# Patient Record
Sex: FEMALE | Race: WHITE | NOT HISPANIC OR LATINO | Employment: OTHER | RURAL
[De-identification: names, ages, dates, MRNs, and addresses within clinical notes are randomized per-mention and may not be internally consistent; named-entity substitution may affect disease eponyms.]

---

## 2022-09-28 ENCOUNTER — OFFICE VISIT (OUTPATIENT)
Dept: FAMILY MEDICINE | Facility: CLINIC | Age: 77
End: 2022-09-28
Payer: MEDICARE

## 2022-09-28 VITALS
DIASTOLIC BLOOD PRESSURE: 80 MMHG | OXYGEN SATURATION: 98 % | HEIGHT: 66 IN | WEIGHT: 230 LBS | BODY MASS INDEX: 36.96 KG/M2 | TEMPERATURE: 98 F | HEART RATE: 78 BPM | RESPIRATION RATE: 16 BRPM | SYSTOLIC BLOOD PRESSURE: 128 MMHG

## 2022-09-28 DIAGNOSIS — Z12.31 SCREENING MAMMOGRAM FOR BREAST CANCER: ICD-10-CM

## 2022-09-28 DIAGNOSIS — Z23 NEED FOR PROPHYLACTIC VACCINATION AND INOCULATION AGAINST INFLUENZA: ICD-10-CM

## 2022-09-28 DIAGNOSIS — Z12.11 COLON CANCER SCREENING: ICD-10-CM

## 2022-09-28 DIAGNOSIS — Z79.899 ENCOUNTER FOR LONG-TERM (CURRENT) USE OF OTHER MEDICATIONS: ICD-10-CM

## 2022-09-28 DIAGNOSIS — Z78.0 MENOPAUSE: ICD-10-CM

## 2022-09-28 DIAGNOSIS — Z13.1 SCREENING FOR DIABETES MELLITUS: ICD-10-CM

## 2022-09-28 DIAGNOSIS — E03.9 ACQUIRED HYPOTHYROIDISM: Primary | ICD-10-CM

## 2022-09-28 DIAGNOSIS — E55.9 VITAMIN D DEFICIENCY: ICD-10-CM

## 2022-09-28 DIAGNOSIS — Z13.220 SCREENING FOR LIPOID DISORDERS: ICD-10-CM

## 2022-09-28 DIAGNOSIS — L40.9 PSORIASIS: ICD-10-CM

## 2022-09-28 LAB
25(OH)D3 SERPL-MCNC: 23.6 NG/ML
ALBUMIN SERPL BCP-MCNC: 3.9 G/DL (ref 3.5–5)
ALBUMIN/GLOB SERPL: 1.1 {RATIO}
ALP SERPL-CCNC: 85 U/L (ref 55–142)
ALT SERPL W P-5'-P-CCNC: 44 U/L (ref 13–56)
ANION GAP SERPL CALCULATED.3IONS-SCNC: 12 MMOL/L (ref 7–16)
AST SERPL W P-5'-P-CCNC: 19 U/L (ref 15–37)
BASOPHILS # BLD AUTO: 0.06 K/UL (ref 0–0.2)
BASOPHILS NFR BLD AUTO: 0.9 % (ref 0–1)
BILIRUB SERPL-MCNC: 0.3 MG/DL (ref ?–1.2)
BUN SERPL-MCNC: 12 MG/DL (ref 7–18)
BUN/CREAT SERPL: 16 (ref 6–20)
CALCIUM SERPL-MCNC: 9.1 MG/DL (ref 8.5–10.1)
CHLORIDE SERPL-SCNC: 102 MMOL/L (ref 98–107)
CHOLEST SERPL-MCNC: 232 MG/DL (ref 0–200)
CHOLEST/HDLC SERPL: 6.6 {RATIO}
CO2 SERPL-SCNC: 30 MMOL/L (ref 21–32)
CREAT SERPL-MCNC: 0.74 MG/DL (ref 0.55–1.02)
DIFFERENTIAL METHOD BLD: ABNORMAL
EGFR (NO RACE VARIABLE) (RUSH/TITUS): 84 ML/MIN/1.73M²
EOSINOPHIL # BLD AUTO: 0.19 K/UL (ref 0–0.5)
EOSINOPHIL NFR BLD AUTO: 2.9 % (ref 1–4)
ERYTHROCYTE [DISTWIDTH] IN BLOOD BY AUTOMATED COUNT: 13.4 % (ref 11.5–14.5)
EST. AVERAGE GLUCOSE BLD GHB EST-MCNC: 107 MG/DL
GLOBULIN SER-MCNC: 3.4 G/DL (ref 2–4)
GLUCOSE SERPL-MCNC: 88 MG/DL (ref 74–106)
HBA1C MFR BLD HPLC: 5.8 % (ref 4.5–6.6)
HCT VFR BLD AUTO: 43.7 % (ref 38–47)
HDLC SERPL-MCNC: 35 MG/DL (ref 40–60)
HGB BLD-MCNC: 14.9 G/DL (ref 12–16)
IMM GRANULOCYTES # BLD AUTO: 0.01 K/UL (ref 0–0.04)
IMM GRANULOCYTES NFR BLD: 0.2 % (ref 0–0.4)
LDLC SERPL CALC-MCNC: 161 MG/DL
LDLC/HDLC SERPL: 4.6 {RATIO}
LYMPHOCYTES # BLD AUTO: 1.95 K/UL (ref 1–4.8)
LYMPHOCYTES NFR BLD AUTO: 29.8 % (ref 27–41)
MCH RBC QN AUTO: 29.9 PG (ref 27–31)
MCHC RBC AUTO-ENTMCNC: 34.1 G/DL (ref 32–36)
MCV RBC AUTO: 87.6 FL (ref 80–96)
MONOCYTES # BLD AUTO: 0.59 K/UL (ref 0–0.8)
MONOCYTES NFR BLD AUTO: 9 % (ref 2–6)
MPC BLD CALC-MCNC: 10.2 FL (ref 9.4–12.4)
NEUTROPHILS # BLD AUTO: 3.74 K/UL (ref 1.8–7.7)
NEUTROPHILS NFR BLD AUTO: 57.2 % (ref 53–65)
NONHDLC SERPL-MCNC: 197 MG/DL
NRBC # BLD AUTO: 0 X10E3/UL
NRBC, AUTO (.00): 0 %
PLATELET # BLD AUTO: 307 K/UL (ref 150–400)
POTASSIUM SERPL-SCNC: 4.6 MMOL/L (ref 3.5–5.1)
PROT SERPL-MCNC: 7.3 G/DL (ref 6.4–8.2)
RBC # BLD AUTO: 4.99 M/UL (ref 4.2–5.4)
SODIUM SERPL-SCNC: 139 MMOL/L (ref 136–145)
T4 FREE SERPL-MCNC: 1.22 NG/DL (ref 0.76–1.46)
TRIGL SERPL-MCNC: 182 MG/DL (ref 35–150)
TSH SERPL DL<=0.005 MIU/L-ACNC: 2.84 UIU/ML (ref 0.36–3.74)
VLDLC SERPL-MCNC: 36 MG/DL
WBC # BLD AUTO: 6.54 K/UL (ref 4.5–11)

## 2022-09-28 PROCEDURE — 99204 PR OFFICE/OUTPT VISIT, NEW, LEVL IV, 45-59 MIN: ICD-10-PCS | Mod: ,,, | Performed by: FAMILY MEDICINE

## 2022-09-28 PROCEDURE — G0008 FLU VACCINE - QUADRIVALENT - ADJUVANTED: ICD-10-PCS | Mod: ,,, | Performed by: FAMILY MEDICINE

## 2022-09-28 PROCEDURE — 84439 T4, FREE: ICD-10-PCS | Mod: ,,, | Performed by: CLINICAL MEDICAL LABORATORY

## 2022-09-28 PROCEDURE — 99204 OFFICE O/P NEW MOD 45 MIN: CPT | Mod: ,,, | Performed by: FAMILY MEDICINE

## 2022-09-28 PROCEDURE — 84443 ASSAY THYROID STIM HORMONE: CPT | Mod: ,,, | Performed by: CLINICAL MEDICAL LABORATORY

## 2022-09-28 PROCEDURE — 82306 VITAMIN D 25 HYDROXY: CPT | Mod: ,,, | Performed by: CLINICAL MEDICAL LABORATORY

## 2022-09-28 PROCEDURE — 90694 FLU VACCINE - QUADRIVALENT - ADJUVANTED: ICD-10-PCS | Mod: ,,, | Performed by: FAMILY MEDICINE

## 2022-09-28 PROCEDURE — 80053 COMPREHENSIVE METABOLIC PANEL: ICD-10-PCS | Mod: ,,, | Performed by: CLINICAL MEDICAL LABORATORY

## 2022-09-28 PROCEDURE — 85025 COMPLETE CBC W/AUTO DIFF WBC: CPT | Mod: ,,, | Performed by: CLINICAL MEDICAL LABORATORY

## 2022-09-28 PROCEDURE — G0008 ADMIN INFLUENZA VIRUS VAC: HCPCS | Mod: ,,, | Performed by: FAMILY MEDICINE

## 2022-09-28 PROCEDURE — 90694 VACC AIIV4 NO PRSRV 0.5ML IM: CPT | Mod: ,,, | Performed by: FAMILY MEDICINE

## 2022-09-28 PROCEDURE — 83036 HEMOGLOBIN A1C: ICD-10-PCS | Mod: ,,, | Performed by: CLINICAL MEDICAL LABORATORY

## 2022-09-28 PROCEDURE — 84439 ASSAY OF FREE THYROXINE: CPT | Mod: ,,, | Performed by: CLINICAL MEDICAL LABORATORY

## 2022-09-28 PROCEDURE — 85025 CBC WITH DIFFERENTIAL: ICD-10-PCS | Mod: ,,, | Performed by: CLINICAL MEDICAL LABORATORY

## 2022-09-28 PROCEDURE — 80061 LIPID PANEL: CPT | Mod: ,,, | Performed by: CLINICAL MEDICAL LABORATORY

## 2022-09-28 PROCEDURE — 80061 LIPID PANEL: ICD-10-PCS | Mod: ,,, | Performed by: CLINICAL MEDICAL LABORATORY

## 2022-09-28 PROCEDURE — 84443 TSH: ICD-10-PCS | Mod: ,,, | Performed by: CLINICAL MEDICAL LABORATORY

## 2022-09-28 PROCEDURE — 80053 COMPREHEN METABOLIC PANEL: CPT | Mod: ,,, | Performed by: CLINICAL MEDICAL LABORATORY

## 2022-09-28 PROCEDURE — 82306 VITAMIN D: ICD-10-PCS | Mod: ,,, | Performed by: CLINICAL MEDICAL LABORATORY

## 2022-09-28 PROCEDURE — 83036 HEMOGLOBIN GLYCOSYLATED A1C: CPT | Mod: ,,, | Performed by: CLINICAL MEDICAL LABORATORY

## 2022-09-28 RX ORDER — ASPIRIN 81 MG/1
81 TABLET ORAL DAILY
COMMUNITY

## 2022-09-28 RX ORDER — EPINEPHRINE 0.22MG
200 AEROSOL WITH ADAPTER (ML) INHALATION DAILY
COMMUNITY

## 2022-09-28 RX ORDER — LEVOTHYROXINE SODIUM 150 UG/1
150 TABLET ORAL DAILY
COMMUNITY
Start: 2022-06-22 | End: 2022-09-28 | Stop reason: SDUPTHER

## 2022-09-28 RX ORDER — LEVOTHYROXINE SODIUM 150 UG/1
150 TABLET ORAL DAILY
Qty: 30 TABLET | Refills: 0 | Status: SHIPPED | OUTPATIENT
Start: 2022-09-28 | End: 2022-10-05 | Stop reason: SDUPTHER

## 2022-09-28 RX ORDER — ACETAMINOPHEN 500 MG
5000 TABLET ORAL DAILY
COMMUNITY

## 2022-09-28 RX ORDER — LANOLIN ALCOHOL/MO/W.PET/CERES
1000 CREAM (GRAM) TOPICAL DAILY
COMMUNITY

## 2022-09-28 NOTE — PROGRESS NOTES
Subjective:       Patient ID: Stephanie Radford is a 77 y.o. female.    Chief Complaint: Establish Care    New pt, getting est.  Reviewed PMH:  she brought records, will be scanned.    Review of Systems   Constitutional:  Negative for appetite change, chills, fatigue, fever and unexpected weight change.   Respiratory:  Negative for cough and shortness of breath.    Cardiovascular:  Negative for chest pain and leg swelling.   Gastrointestinal:  Negative for abdominal pain.   Musculoskeletal:  Negative for arthralgias.   Integumentary:  Negative for rash.   Neurological:  Negative for weakness.   Psychiatric/Behavioral:  The patient is not nervous/anxious.        Objective:      Physical Exam  Constitutional:       General: She is not in acute distress.     Appearance: Normal appearance.   Cardiovascular:      Rate and Rhythm: Normal rate and regular rhythm.      Heart sounds: Normal heart sounds.   Pulmonary:      Breath sounds: Normal breath sounds.   Abdominal:      General: Abdomen is flat.      Palpations: Abdomen is soft.   Skin:     General: Skin is warm and dry.   Neurological:      Mental Status: She is alert. Mental status is at baseline.   Psychiatric:         Mood and Affect: Mood normal.         Behavior: Behavior normal.         Thought Content: Thought content normal.         Judgment: Judgment normal.       Assessment:       1. Acquired hypothyroidism  CBC Auto Differential    Lipid Panel    T4, Free    TSH    CBC Auto Differential    Lipid Panel    T4, Free    TSH      2. Need for prophylactic vaccination and inoculation against influenza  Influenza (FLUAD) - Quadrivalent (Adjuvanted) *Preferred* (65+) (PF)      3. Encounter for long-term (current) use of other medications  CBC Auto Differential    Comprehensive Metabolic Panel    Hemoglobin A1C    Lipid Panel    Vitamin D    CBC Auto Differential    Comprehensive Metabolic Panel    Hemoglobin A1C    Lipid Panel    Vitamin D      4. BMI 37.0-37.9,  adult  Comprehensive Metabolic Panel    Hemoglobin A1C    Comprehensive Metabolic Panel    Hemoglobin A1C      5. Screening for lipoid disorders  Lipid Panel    Lipid Panel      6. Screening for diabetes mellitus  Comprehensive Metabolic Panel    Hemoglobin A1C    Comprehensive Metabolic Panel    Hemoglobin A1C      7. Vitamin D deficiency  Vitamin D    Vitamin D      8. Psoriasis  CBC Auto Differential    Comprehensive Metabolic Panel    Hemoglobin A1C    Lipid Panel    CBC Auto Differential    Comprehensive Metabolic Panel    Hemoglobin A1C    Lipid Panel    Ambulatory referral/consult to Dermatology      9. Screening mammogram for breast cancer  Mammo Digital Screening Bilat      10. Menopause  DXA Bone Density Spine And Hip      11. Colon cancer screening  Cologuard Screening (Multitarget Stool DNA)    Cologuard Screening (Multitarget Stool DNA)          Plan:       Labs  Maria Alejandra  Pt will make her own eye appt  Cologuard  BMD same day as maria alejandra  Flu vacc today  Synthroid 150 30 day supply--[facility]  Derm referral--psoriasis, arthritis

## 2022-10-03 PROBLEM — E55.9 VITAMIN D DEFICIENCY: Status: ACTIVE | Noted: 2022-10-03

## 2022-10-03 PROBLEM — Z12.31 SCREENING MAMMOGRAM FOR BREAST CANCER: Status: ACTIVE | Noted: 2022-10-03

## 2022-10-03 PROBLEM — E03.9 ACQUIRED HYPOTHYROIDISM: Status: ACTIVE | Noted: 2022-10-03

## 2022-10-03 PROBLEM — Z79.899 ENCOUNTER FOR LONG-TERM (CURRENT) USE OF OTHER MEDICATIONS: Status: ACTIVE | Noted: 2022-10-03

## 2022-10-03 PROBLEM — L40.9 PSORIASIS: Status: ACTIVE | Noted: 2022-10-03

## 2022-10-03 PROBLEM — Z78.0 MENOPAUSE: Status: ACTIVE | Noted: 2022-10-03

## 2022-10-05 RX ORDER — EZETIMIBE 10 MG/1
10 TABLET ORAL DAILY
Qty: 30 TABLET | Refills: 5 | Status: SHIPPED | OUTPATIENT
Start: 2022-10-05 | End: 2023-02-06

## 2022-10-05 RX ORDER — LEVOTHYROXINE SODIUM 150 UG/1
150 TABLET ORAL DAILY
Qty: 90 TABLET | Refills: 3 | Status: SHIPPED | OUTPATIENT
Start: 2022-10-05 | End: 2023-09-27 | Stop reason: DRUGHIGH

## 2022-10-09 DIAGNOSIS — Z71.89 COMPLEX CARE COORDINATION: ICD-10-CM

## 2022-10-27 ENCOUNTER — HOSPITAL ENCOUNTER (OUTPATIENT)
Dept: RADIOLOGY | Facility: HOSPITAL | Age: 77
Discharge: HOME | End: 2022-10-27
Attending: FAMILY MEDICINE
Payer: MEDICARE

## 2022-10-27 VITALS — HEIGHT: 66 IN | BODY MASS INDEX: 36.96 KG/M2 | WEIGHT: 230 LBS

## 2022-10-27 DIAGNOSIS — Z12.31 SCREENING MAMMOGRAM FOR BREAST CANCER: ICD-10-CM

## 2022-10-27 DIAGNOSIS — Z78.0 MENOPAUSE: ICD-10-CM

## 2022-10-27 PROCEDURE — 77080 DXA BONE DENSITY AXIAL: CPT | Mod: TC

## 2022-10-27 PROCEDURE — 77080 DEXA BONE DENSITY SPINE HIP: ICD-10-PCS | Mod: 26,,, | Performed by: STUDENT IN AN ORGANIZED HEALTH CARE EDUCATION/TRAINING PROGRAM

## 2022-10-27 PROCEDURE — 77080 DXA BONE DENSITY AXIAL: CPT | Mod: 26,,, | Performed by: STUDENT IN AN ORGANIZED HEALTH CARE EDUCATION/TRAINING PROGRAM

## 2022-10-27 PROCEDURE — 77067 SCR MAMMO BI INCL CAD: CPT | Mod: TC

## 2022-11-04 LAB — NONINV COLON CA DNA+OCC BLD SCRN STL QL: NEGATIVE

## 2022-11-17 ENCOUNTER — OFFICE VISIT (OUTPATIENT)
Dept: DERMATOLOGY | Facility: CLINIC | Age: 77
End: 2022-11-17
Payer: MEDICARE

## 2022-11-17 VITALS — BODY MASS INDEX: 36.95 KG/M2 | HEIGHT: 66 IN | WEIGHT: 229.94 LBS

## 2022-11-17 DIAGNOSIS — D18.01 CHERRY ANGIOMA: ICD-10-CM

## 2022-11-17 DIAGNOSIS — L57.8 OTHER SKIN CHANGES DUE TO CHRONIC EXPOSURE TO NONIONIZING RADIATION: Primary | ICD-10-CM

## 2022-11-17 DIAGNOSIS — L82.1 SEBORRHEIC KERATOSES: ICD-10-CM

## 2022-11-17 DIAGNOSIS — L82.0 SEBORRHEIC KERATOSES, INFLAMED: ICD-10-CM

## 2022-11-17 DIAGNOSIS — L40.9 PSORIASIS: ICD-10-CM

## 2022-11-17 PROCEDURE — 17110 DESTRUCTION B9 LES UP TO 14: CPT | Mod: ,,, | Performed by: DERMATOLOGY

## 2022-11-17 PROCEDURE — 99204 OFFICE O/P NEW MOD 45 MIN: CPT | Mod: 25,,, | Performed by: DERMATOLOGY

## 2022-11-17 PROCEDURE — 99204 PR OFFICE/OUTPT VISIT, NEW, LEVL IV, 45-59 MIN: ICD-10-PCS | Mod: 25,,, | Performed by: DERMATOLOGY

## 2022-11-17 PROCEDURE — 17110 PR DESTRUCTION BENIGN LESIONS UP TO 14: ICD-10-PCS | Mod: ,,, | Performed by: DERMATOLOGY

## 2022-11-17 RX ORDER — CLOBETASOL PROPIONATE 0.5 MG/G
CREAM TOPICAL
Qty: 60 G | Refills: 3 | Status: SHIPPED | OUTPATIENT
Start: 2022-11-17

## 2022-11-17 NOTE — PROGRESS NOTES
[facility]   Ning Mccurdy MD    Patient Name: Stephanie Radford  Patient YOB: 1945   Date of Service: 11/17/22    CC: Psoriasis    HPI: Stephanie Radford is a 77 y.o. female here today for psoriasis, located on the right forearm and right ankle.  Psoriasis has been present for 20 years.  Previous treatments include none.  Patient is also concerned today about lesions on neck, chest, face, and back. Patient has history of AK's and excisions but does not remember the pathology.    Past Medical History:   Diagnosis Date    Hypothyroidism      Past Surgical History:   Procedure Laterality Date    TUBAL LIGATION       Review of patient's allergies indicates:   Allergen Reactions    Penicillins Hives    Tylenol [acetaminophen]     Clindamycin Rash    Rocephin [ceftriaxone] Rash       Current Outpatient Medications:     aspirin (ECOTRIN) 81 MG EC tablet, Take 81 mg by mouth once daily., Disp: , Rfl:     cholecalciferol, vitamin D3, (VITAMIN D3) 125 mcg (5,000 unit) Tab, Take 5,000 Units by mouth once daily., Disp: , Rfl:     clobetasoL (TEMOVATE) 0.05 % cream, Apply to AA on body BID PRN flares tapering with improvement, Disp: 60 g, Rfl: 3    coenzyme Q10 (CO Q-10) 100 mg capsule, Take 200 mg by mouth once daily., Disp: , Rfl:     cyanocobalamin (VITAMIN B-12) 1000 MCG tablet, Take 1,000 mcg by mouth once daily., Disp: , Rfl:     ezetimibe (ZETIA) 10 mg tablet, Take 1 tablet (10 mg total) by mouth once daily., Disp: 30 tablet, Rfl: 5    levothyroxine (SYNTHROID) 150 MCG tablet, Take 1 tablet (150 mcg total) by mouth once daily., Disp: 90 tablet, Rfl: 3    multivit-min/iron/folic/lutein (CENTRUM SILVER WOMEN ORAL), Take 1 tablet by mouth Daily., Disp: , Rfl:     ROS: A focused review of systems was obtained and negative.     Exam: A focused skin exam was performed. All areas examined were normal except as mentioned in the assessment and plan below.  General Appearance of the patient is well developed  and well nourished.  Orientation: alert and oriented x 3.  Mood and affect: pleasant.    Assessment:   The primary encounter diagnosis was Other skin changes due to chronic exposure to nonionizing radiation. Diagnoses of Psoriasis, Seborrheic keratoses, Cherry angioma, and Seborrheic keratoses, inflamed were also pertinent to this visit.    Plan:   Medications Ordered This Encounter   Medications    clobetasoL (TEMOVATE) 0.05 % cream     Sig: Apply to AA on body BID PRN flares tapering with improvement     Dispense:  60 g     Refill:  3       Plaque Psoriasis  - psoriasiform plaques with micaceous scale  Status: Inadequately controlled    Plan: Counseling  I counseled the patient regarding the following:  Skin care: Emollients, ambient sun exposure, shampoos with tar, selenium or zinc pyrithione can improve psoriasis.  Expectations: Psoriasis is chronic in nature with periods of remissions and flares. Flares can be triggered by stress, infections (group A strep), certain medications and alcohol.  Contact office if: Psoriasis worsens, or fails to improve despite several months of treatment.      Mckay Angiomas  - Scattered bright pink papules    Plan: Counseling  I counseled the patient regarding the diagnosis including that cherry angiomas are benign skin growths requiring no treatment. Patients get more as they age.    Seborrheic Keratosis (L82.1)  - Stuck-on, warty, greasy brown papule with pseudo-horn cysts scattered on the trunk and extremities    Plan: Counseling.  I counseled the patient regarding the following:  Skin Care: Seborrheic Keratoses are benign. No treatment is necessary.  Expectations: Seborrheic Keratoses are benign warty growths. Patients get more of them as they age    Plan: Reassurance    Irritated Seborrheic Keratoses (L82.0)  Stuck-on inflamed papules with crust located on the right cheek.  Associated diagnoses: Pruritus and Cutaneous Inflammation    Plan: Liquid Nitrogen.  A total of 1  lesions were treated with liquid nitrogen, located on the above listed location.  This procedure was medically necessary because the lesions that were treated were: irritated and itchy. The  patient's consent was obtained including but not limited to risks of crusting, scabbing, blistering, scarring, darker  or lighter pigmentary change, recurrence, incomplete removal and infection.      Other Skin Changes Due to Chronic Exposure of Nonionizing Radiation (L57.8)    Plan: Monitoring.     Plan: Sunscreen Recommendations.  I recommended a broad spectrum sunscreen with a SPF of 30 or higher. I explained that SPF 30 sunscreens block approximately 97 percent of the  sun's harmful rays. Sunscreens should be applied at least 15 minutes prior to expected sun exposure and then every 2 hours after that as long as  sun exposure continues. If swimming or exercising sunscreen should be reapplied every 45 minutes to an hour after getting wet or sweating. One  ounce, or the equivalent of a shot glass full of sunscreen, is adequate to protect the skin not covered by a bathing suit. I also recommended a lip  balm with a sunscreen as well. Sun protective clothing can be used in lieu of sunscreen but must be worn the entire time you are exposed to the  sun's rays.    Follow up in 3 months (on 2/17/2023) for Psoriasis.    Ning Mccurdy MD

## 2023-02-20 ENCOUNTER — OFFICE VISIT (OUTPATIENT)
Dept: DERMATOLOGY | Facility: CLINIC | Age: 78
End: 2023-02-20
Payer: MEDICARE

## 2023-02-20 VITALS — HEIGHT: 66 IN | BODY MASS INDEX: 36.95 KG/M2 | WEIGHT: 229.94 LBS

## 2023-02-20 DIAGNOSIS — L40.9 PSORIASIS: Primary | ICD-10-CM

## 2023-02-20 PROCEDURE — 99214 PR OFFICE/OUTPT VISIT, EST, LEVL IV, 30-39 MIN: ICD-10-PCS | Mod: ,,, | Performed by: DERMATOLOGY

## 2023-02-20 PROCEDURE — 99214 OFFICE O/P EST MOD 30 MIN: CPT | Mod: ,,, | Performed by: DERMATOLOGY

## 2023-02-20 RX ORDER — BETAMETHASONE DIPROPIONATE 0.5 MG/G
CREAM TOPICAL
Qty: 45 G | Refills: 3 | Status: SHIPPED | OUTPATIENT
Start: 2023-02-20 | End: 2024-02-21 | Stop reason: SDUPTHER

## 2023-02-20 RX ORDER — CLOTRIMAZOLE AND BETAMETHASONE DIPROPIONATE 10; .64 MG/G; MG/G
CREAM TOPICAL 2 TIMES DAILY
Qty: 45 G | Refills: 6 | Status: CANCELLED | OUTPATIENT
Start: 2023-02-20

## 2023-02-20 NOTE — PROGRESS NOTES
[facility]   Ning Mccurdy MD    Patient Name: Stephanie Radford  Patient YOB: 1945   Date of Service: 2/20/23    CC: Follow-up Psoriasis    HPI: Stephanie Radford is a 77 y.o. female here today for follow-up of psoriasis, last seen 11/2022.  Previous treatments include OTC psoriasis cream.  Overall, the psoriasis is improved.  Treatment plan was not followed as directed. Patient reports not using clobetasol due to price.    Past Medical History:   Diagnosis Date    Hypothyroidism      Past Surgical History:   Procedure Laterality Date    TUBAL LIGATION       Review of patient's allergies indicates:   Allergen Reactions    Penicillins Hives    Tylenol [acetaminophen]     Clindamycin Rash    Rocephin [ceftriaxone] Rash       Current Outpatient Medications:     aspirin (ECOTRIN) 81 MG EC tablet, Take 81 mg by mouth once daily., Disp: , Rfl:     cholecalciferol, vitamin D3, 125 mcg (5,000 unit) Tab, Take 5,000 Units by mouth once daily., Disp: , Rfl:     coenzyme Q10 100 mg capsule, Take 200 mg by mouth once daily., Disp: , Rfl:     cyanocobalamin (VITAMIN B-12) 1000 MCG tablet, Take 1,000 mcg by mouth once daily., Disp: , Rfl:     ezetimibe (ZETIA) 10 mg tablet, TAKE 1 TABLET BY MOUTH EVERY DAY, Disp: 90 tablet, Rfl: 0    levothyroxine (SYNTHROID) 150 MCG tablet, Take 1 tablet (150 mcg total) by mouth once daily., Disp: 90 tablet, Rfl: 3    multivit-min/iron/folic/lutein (CENTRUM SILVER WOMEN ORAL), Take 1 tablet by mouth Daily., Disp: , Rfl:     betamethasone dipropionate 0.05 % cream, Apply to AA on body BID PRN flares tapering with improvement, Disp: 45 g, Rfl: 3    clobetasoL (TEMOVATE) 0.05 % cream, Apply to AA on body BID PRN flares tapering with improvement (Patient not taking: Reported on 2/20/2023), Disp: 60 g, Rfl: 3    ROS: A focused review of systems was obtained and negative.     Exam: A focused skin exam was performed. All areas examined were normal except as mentioned in the  assessment and plan below.  General Appearance of the patient is well developed and well nourished.  Orientation: alert and oriented x 3.  Mood and affect: pleasant.    Assessment:   The encounter diagnosis was Psoriasis.    Plan:   Medications Ordered This Encounter   Medications    betamethasone dipropionate 0.05 % cream     Sig: Apply to AA on body BID PRN flares tapering with improvement     Dispense:  45 g     Refill:  3     Plaque Psoriasis  - psoriasiform plaques with micaceous scale  Status: Inadequately controlled    Plan: Counseling  I counseled the patient regarding the following:  Skin care: Emollients, ambient sun exposure, shampoos with tar, selenium or zinc pyrithione can improve psoriasis.  Expectations: Psoriasis is chronic in nature with periods of remissions and flares. Flares can be triggered by stress, infections (group A strep), certain medications and alcohol.  Contact office if: Psoriasis worsens, or fails to improve despite several months of treatment.    - Begin betamethasone    Follow up in about 1 year (around 2/20/2024) for FSE.    Ning Mccurdy MD

## 2023-03-02 ENCOUNTER — OFFICE VISIT (OUTPATIENT)
Dept: FAMILY MEDICINE | Facility: CLINIC | Age: 78
End: 2023-03-02
Payer: MEDICARE

## 2023-03-02 VITALS
HEART RATE: 83 BPM | SYSTOLIC BLOOD PRESSURE: 118 MMHG | WEIGHT: 236 LBS | HEIGHT: 66 IN | TEMPERATURE: 98 F | RESPIRATION RATE: 16 BRPM | BODY MASS INDEX: 37.93 KG/M2 | DIASTOLIC BLOOD PRESSURE: 70 MMHG | OXYGEN SATURATION: 96 %

## 2023-03-02 DIAGNOSIS — E03.9 ACQUIRED HYPOTHYROIDISM: ICD-10-CM

## 2023-03-02 DIAGNOSIS — Z79.899 ENCOUNTER FOR LONG-TERM (CURRENT) USE OF OTHER MEDICATIONS: ICD-10-CM

## 2023-03-02 DIAGNOSIS — E78.2 MIXED HYPERLIPIDEMIA: ICD-10-CM

## 2023-03-02 DIAGNOSIS — E55.9 VITAMIN D DEFICIENCY: ICD-10-CM

## 2023-03-02 DIAGNOSIS — R73.9 HYPERGLYCEMIA: Primary | ICD-10-CM

## 2023-03-02 LAB
25(OH)D3 SERPL-MCNC: 25.8 NG/ML
ALBUMIN SERPL BCP-MCNC: 3.6 G/DL (ref 3.5–5)
ALBUMIN/GLOB SERPL: 0.9 {RATIO}
ALP SERPL-CCNC: 78 U/L (ref 55–142)
ALT SERPL W P-5'-P-CCNC: 49 U/L (ref 13–56)
ANION GAP SERPL CALCULATED.3IONS-SCNC: 6 MMOL/L (ref 7–16)
AST SERPL W P-5'-P-CCNC: 31 U/L (ref 15–37)
BILIRUB SERPL-MCNC: 0.4 MG/DL (ref ?–1.2)
BUN SERPL-MCNC: 16 MG/DL (ref 7–18)
BUN/CREAT SERPL: 21 (ref 6–20)
CALCIUM SERPL-MCNC: 9.2 MG/DL (ref 8.5–10.1)
CHLORIDE SERPL-SCNC: 109 MMOL/L (ref 98–107)
CHOLEST SERPL-MCNC: 168 MG/DL (ref 0–200)
CHOLEST/HDLC SERPL: 4.4 {RATIO}
CO2 SERPL-SCNC: 27 MMOL/L (ref 21–32)
CREAT SERPL-MCNC: 0.78 MG/DL (ref 0.55–1.02)
EGFR (NO RACE VARIABLE) (RUSH/TITUS): 79 ML/MIN/1.73M²
EST. AVERAGE GLUCOSE BLD GHB EST-MCNC: 104 MG/DL
GLOBULIN SER-MCNC: 3.8 G/DL (ref 2–4)
GLUCOSE SERPL-MCNC: 84 MG/DL (ref 74–106)
HBA1C MFR BLD HPLC: 5.7 % (ref 4.5–6.6)
HDLC SERPL-MCNC: 38 MG/DL (ref 40–60)
LDLC SERPL CALC-MCNC: 100 MG/DL
LDLC/HDLC SERPL: 2.6 {RATIO}
NONHDLC SERPL-MCNC: 130 MG/DL
POTASSIUM SERPL-SCNC: 4.2 MMOL/L (ref 3.5–5.1)
PROT SERPL-MCNC: 7.4 G/DL (ref 6.4–8.2)
SODIUM SERPL-SCNC: 138 MMOL/L (ref 136–145)
TRIGL SERPL-MCNC: 151 MG/DL (ref 35–150)
VLDLC SERPL-MCNC: 30 MG/DL

## 2023-03-02 PROCEDURE — 99213 PR OFFICE/OUTPT VISIT, EST, LEVL III, 20-29 MIN: ICD-10-PCS | Mod: ,,, | Performed by: FAMILY MEDICINE

## 2023-03-02 PROCEDURE — 80053 COMPREHENSIVE METABOLIC PANEL: ICD-10-PCS | Mod: ,,, | Performed by: CLINICAL MEDICAL LABORATORY

## 2023-03-02 PROCEDURE — 82306 VITAMIN D: ICD-10-PCS | Mod: ,,, | Performed by: CLINICAL MEDICAL LABORATORY

## 2023-03-02 PROCEDURE — 82306 VITAMIN D 25 HYDROXY: CPT | Mod: ,,, | Performed by: CLINICAL MEDICAL LABORATORY

## 2023-03-02 PROCEDURE — 83036 HEMOGLOBIN A1C: ICD-10-PCS | Mod: ,,, | Performed by: CLINICAL MEDICAL LABORATORY

## 2023-03-02 PROCEDURE — 80061 LIPID PANEL: CPT | Mod: ,,, | Performed by: CLINICAL MEDICAL LABORATORY

## 2023-03-02 PROCEDURE — 80061 LIPID PANEL: ICD-10-PCS | Mod: ,,, | Performed by: CLINICAL MEDICAL LABORATORY

## 2023-03-02 PROCEDURE — 99213 OFFICE O/P EST LOW 20 MIN: CPT | Mod: ,,, | Performed by: FAMILY MEDICINE

## 2023-03-02 PROCEDURE — 83036 HEMOGLOBIN GLYCOSYLATED A1C: CPT | Mod: ,,, | Performed by: CLINICAL MEDICAL LABORATORY

## 2023-03-02 PROCEDURE — 80053 COMPREHEN METABOLIC PANEL: CPT | Mod: ,,, | Performed by: CLINICAL MEDICAL LABORATORY

## 2023-03-02 NOTE — PROGRESS NOTES
Subjective:       Patient ID: Stephanie Radford is a 77 y.o. female.    Chief Complaint: Follow-up (6 month)    Recheck on sugar/labs.  No new complaints except legs swelling.  Discussed usual causes.    Review of Systems   Constitutional:  Negative for appetite change, chills, fatigue, fever and unexpected weight change.   Respiratory:  Negative for cough and shortness of breath.    Cardiovascular:  Positive for leg swelling. Negative for chest pain.   Gastrointestinal:  Negative for abdominal pain.   Musculoskeletal:  Negative for arthralgias.   Integumentary:  Negative for rash.   Neurological:  Negative for weakness.   Psychiatric/Behavioral:  The patient is not nervous/anxious.        Objective:      Physical Exam  Constitutional:       General: She is not in acute distress.     Appearance: Normal appearance.   Cardiovascular:      Rate and Rhythm: Normal rate and regular rhythm.      Heart sounds: Normal heart sounds.   Pulmonary:      Breath sounds: Normal breath sounds.   Abdominal:      General: Abdomen is flat.      Palpations: Abdomen is soft.   Skin:     General: Skin is warm and dry.   Neurological:      Mental Status: She is alert. Mental status is at baseline.   Psychiatric:         Mood and Affect: Mood normal.         Behavior: Behavior normal.         Thought Content: Thought content normal.         Judgment: Judgment normal.       Assessment:       1. Hyperglycemia  Comprehensive Metabolic Panel    Hemoglobin A1C    Comprehensive Metabolic Panel    Hemoglobin A1C      2. Encounter for long-term (current) use of other medications  Comprehensive Metabolic Panel    Hemoglobin A1C    Lipid Panel    Vitamin D    Comprehensive Metabolic Panel    Hemoglobin A1C    Lipid Panel    Vitamin D      3. Vitamin D deficiency  Vitamin D    Vitamin D      4. Mixed hyperlipidemia  Comprehensive Metabolic Panel    Lipid Panel    Comprehensive Metabolic Panel    Lipid Panel      5. Acquired hypothyroidism             Plan:       Compression socks  F/u 6 mo one more time with yearly labs, then move out to yearly  UTD [facility]

## 2023-05-09 DIAGNOSIS — Z71.89 COMPLEX CARE COORDINATION: ICD-10-CM

## 2023-07-12 ENCOUNTER — APPOINTMENT (OUTPATIENT)
Dept: RADIOLOGY | Facility: CLINIC | Age: 78
End: 2023-07-12
Attending: FAMILY MEDICINE
Payer: MEDICARE

## 2023-07-12 ENCOUNTER — OFFICE VISIT (OUTPATIENT)
Dept: FAMILY MEDICINE | Facility: CLINIC | Age: 78
End: 2023-07-12
Payer: MEDICARE

## 2023-07-12 VITALS
BODY MASS INDEX: 38.09 KG/M2 | WEIGHT: 237 LBS | OXYGEN SATURATION: 94 % | SYSTOLIC BLOOD PRESSURE: 155 MMHG | DIASTOLIC BLOOD PRESSURE: 81 MMHG | HEART RATE: 68 BPM | TEMPERATURE: 98 F | RESPIRATION RATE: 18 BRPM | HEIGHT: 66 IN

## 2023-07-12 DIAGNOSIS — E55.9 VITAMIN D DEFICIENCY: ICD-10-CM

## 2023-07-12 DIAGNOSIS — E03.9 ACQUIRED HYPOTHYROIDISM: ICD-10-CM

## 2023-07-12 DIAGNOSIS — R05.9 COUGH, UNSPECIFIED TYPE: ICD-10-CM

## 2023-07-12 DIAGNOSIS — R73.03 PRE-DIABETES: ICD-10-CM

## 2023-07-12 DIAGNOSIS — R05.9 COUGH, UNSPECIFIED TYPE: Primary | ICD-10-CM

## 2023-07-12 DIAGNOSIS — E78.2 MIXED HYPERLIPIDEMIA: ICD-10-CM

## 2023-07-12 DIAGNOSIS — J40 BRONCHITIS: ICD-10-CM

## 2023-07-12 PROCEDURE — 71046 X-RAY EXAM CHEST 2 VIEWS: CPT | Mod: TC,RHCUB | Performed by: FAMILY MEDICINE

## 2023-07-12 PROCEDURE — 99214 PR OFFICE/OUTPT VISIT, EST, LEVL IV, 30-39 MIN: ICD-10-PCS | Mod: ,,, | Performed by: FAMILY MEDICINE

## 2023-07-12 PROCEDURE — 71046 X-RAY EXAM CHEST 2 VIEWS: CPT | Mod: 26,,, | Performed by: RADIOLOGY

## 2023-07-12 PROCEDURE — 99214 OFFICE O/P EST MOD 30 MIN: CPT | Mod: ,,, | Performed by: FAMILY MEDICINE

## 2023-07-12 PROCEDURE — 71046 XR CHEST PA AND LATERAL: ICD-10-PCS | Mod: 26,,, | Performed by: RADIOLOGY

## 2023-07-12 RX ORDER — DOXYCYCLINE 100 MG/1
100 CAPSULE ORAL 2 TIMES DAILY
Qty: 20 CAPSULE | Refills: 0 | Status: SHIPPED | OUTPATIENT
Start: 2023-07-12 | End: 2023-09-27

## 2023-08-10 RX ORDER — EZETIMIBE 10 MG/1
TABLET ORAL
Qty: 90 TABLET | Refills: 0 | Status: SHIPPED | OUTPATIENT
Start: 2023-08-10 | End: 2023-11-28

## 2023-08-13 NOTE — PROGRESS NOTES
"Subjective     Patient ID: Stephanie Radford is a 77 y.o. female.    Chief Complaint: Cough and Feels like she  has pneumonia    76 yo WF here to establish from . also has had right sided chest soreness and cough for 2 weeks. Worried about pneumonia. No smoking or asthma. No fever or sputum.     Cough  Pertinent negatives include no chest pain, fever, headaches or shortness of breath.     Review of Systems   Constitutional:  Negative for activity change and fever.   Eyes:  Negative for visual disturbance.   Respiratory:  Positive for cough. Negative for shortness of breath.    Cardiovascular:  Negative for chest pain.   Gastrointestinal:  Negative for abdominal pain.   Neurological:  Negative for headaches.   Psychiatric/Behavioral:  Negative for dysphoric mood.      CXR was ok.      Objective   Blood pressure (!) 155/81, pulse 68, temperature 97.8 °F (36.6 °C), temperature source Oral, resp. rate 18, height 5' 6" (1.676 m), weight 107.5 kg (237 lb), SpO2 (!) 94 %.    Physical Exam  Constitutional:       Appearance: Normal appearance.   HENT:      Head: Normocephalic and atraumatic.      Right Ear: External ear normal.      Left Ear: External ear normal.      Nose: Nose normal.      Mouth/Throat:      Mouth: Mucous membranes are moist.   Eyes:      Conjunctiva/sclera: Conjunctivae normal.      Pupils: Pupils are equal, round, and reactive to light.   Cardiovascular:      Rate and Rhythm: Normal rate and regular rhythm.      Pulses: Normal pulses.      Heart sounds: Normal heart sounds.   Pulmonary:      Effort: Pulmonary effort is normal.      Breath sounds: Normal breath sounds.   Abdominal:      General: Abdomen is flat.      Palpations: Abdomen is soft.   Musculoskeletal:         General: Normal range of motion.      Cervical back: Normal range of motion and neck supple.   Skin:     General: Skin is warm and dry.   Neurological:      General: No focal deficit present.      Mental Status: She is alert and " oriented to person, place, and time.   Psychiatric:         Mood and Affect: Mood normal.         Behavior: Behavior normal.         Thought Content: Thought content normal.         Judgment: Judgment normal.            Assessment and Plan     1. Cough, unspecified type  -     X-Ray Chest PA And Lateral; Future; Expected date: 07/12/2023    2. Bronchitis  -     doxycycline (MONODOX) 100 MG capsule; Take 1 capsule (100 mg total) by mouth 2 (two) times a day.  Dispense: 20 capsule; Refill: 0    3. Vitamin D deficiency  -     Vitamin D; Future; Expected date: 09/18/2023    4. Mixed hyperlipidemia  -     Urinalysis; Future; Expected date: 09/18/2023  -     Lipid Panel; Future; Expected date: 09/18/2023  -     CBC Auto Differential; Future; Expected date: 09/18/2023  -     Comprehensive Metabolic Panel; Future; Expected date: 09/18/2023  -     TSH; Future; Expected date: 09/18/2023    5. Acquired hypothyroidism  -     Urinalysis; Future; Expected date: 09/18/2023  -     Lipid Panel; Future; Expected date: 09/18/2023  -     CBC Auto Differential; Future; Expected date: 09/18/2023  -     Comprehensive Metabolic Panel; Future; Expected date: 09/18/2023  -     TSH; Future; Expected date: 09/18/2023    6. Pre-diabetes  -     Hemoglobin A1C; Future; Expected date: 09/18/2023        RTC prn.          Follow up in about 2 months (around 9/18/2023) for med refill and fasting lab.

## 2023-09-08 DIAGNOSIS — E07.9 THYROID CONDITION: ICD-10-CM

## 2023-09-08 DIAGNOSIS — E03.9 ACQUIRED HYPOTHYROIDISM: Primary | ICD-10-CM

## 2023-09-27 ENCOUNTER — OFFICE VISIT (OUTPATIENT)
Dept: FAMILY MEDICINE | Facility: CLINIC | Age: 78
End: 2023-09-27
Payer: MEDICARE

## 2023-09-27 VITALS
BODY MASS INDEX: 37.77 KG/M2 | RESPIRATION RATE: 18 BRPM | HEIGHT: 66 IN | OXYGEN SATURATION: 93 % | SYSTOLIC BLOOD PRESSURE: 156 MMHG | TEMPERATURE: 98 F | HEART RATE: 76 BPM | DIASTOLIC BLOOD PRESSURE: 75 MMHG | WEIGHT: 235 LBS

## 2023-09-27 DIAGNOSIS — E78.2 MIXED HYPERLIPIDEMIA: Chronic | ICD-10-CM

## 2023-09-27 DIAGNOSIS — E89.0 POSTABLATIVE HYPOTHYROIDISM: Primary | Chronic | ICD-10-CM

## 2023-09-27 DIAGNOSIS — Z23 NEED FOR VACCINATION: ICD-10-CM

## 2023-09-27 PROCEDURE — G0008 ADMIN INFLUENZA VIRUS VAC: HCPCS | Mod: ,,, | Performed by: FAMILY MEDICINE

## 2023-09-27 PROCEDURE — 99214 OFFICE O/P EST MOD 30 MIN: CPT | Mod: ,,, | Performed by: FAMILY MEDICINE

## 2023-09-27 PROCEDURE — G0008 FLU VACCINE - QUADRIVALENT - ADJUVANTED: ICD-10-PCS | Mod: ,,, | Performed by: FAMILY MEDICINE

## 2023-09-27 PROCEDURE — 99214 PR OFFICE/OUTPT VISIT, EST, LEVL IV, 30-39 MIN: ICD-10-PCS | Mod: ,,, | Performed by: FAMILY MEDICINE

## 2023-09-27 PROCEDURE — 90694 VACC AIIV4 NO PRSRV 0.5ML IM: CPT | Mod: ,,, | Performed by: FAMILY MEDICINE

## 2023-09-27 PROCEDURE — 90694 FLU VACCINE - QUADRIVALENT - ADJUVANTED: ICD-10-PCS | Mod: ,,, | Performed by: FAMILY MEDICINE

## 2023-09-27 RX ORDER — LEVOCETIRIZINE DIHYDROCHLORIDE 5 MG/1
5 TABLET, FILM COATED ORAL NIGHTLY
COMMUNITY

## 2023-09-27 RX ORDER — LEVOTHYROXINE SODIUM 175 UG/1
175 TABLET ORAL
Qty: 90 TABLET | Refills: 3 | Status: SHIPPED | OUTPATIENT
Start: 2023-09-27 | End: 2024-09-26

## 2023-10-03 DIAGNOSIS — Z11.59 NEED FOR HEPATITIS C SCREENING TEST: Primary | ICD-10-CM

## 2023-10-03 DIAGNOSIS — E89.0 POSTABLATIVE HYPOTHYROIDISM: ICD-10-CM

## 2023-11-02 ENCOUNTER — HOSPITAL ENCOUNTER (OUTPATIENT)
Dept: RADIOLOGY | Facility: HOSPITAL | Age: 78
Discharge: HOME | End: 2023-11-02
Attending: FAMILY MEDICINE
Payer: MEDICARE

## 2023-11-02 VITALS — WEIGHT: 235 LBS | BODY MASS INDEX: 37.77 KG/M2 | HEIGHT: 66 IN

## 2023-11-02 DIAGNOSIS — Z12.31 OTHER SCREENING MAMMOGRAM: ICD-10-CM

## 2023-11-02 PROCEDURE — 77067 SCR MAMMO BI INCL CAD: CPT | Mod: TC

## 2023-11-18 PROBLEM — E78.2 MIXED HYPERLIPIDEMIA: Chronic | Status: ACTIVE | Noted: 2023-11-18

## 2023-11-18 NOTE — PROGRESS NOTES
Subjective     Patient ID: Stephanie Radford is a 78 y.o. female.    Chief Complaint: Annual Exam    77 yo WF here for check up and lab.       Review of Systems   Constitutional:  Negative for activity change.   Eyes:  Negative for visual disturbance.   Respiratory:  Negative for shortness of breath.    Cardiovascular:  Negative for chest pain.   Gastrointestinal:  Negative for abdominal pain.   Neurological:  Negative for headaches.   Psychiatric/Behavioral:  Negative for dysphoric mood.      Lab Visit on 09/15/2023   Component Date Value Ref Range Status    Color, UA 09/15/2023 Yellow  Colorless, Straw, Yellow, Light Yellow, Dark Yellow Final    Clarity, UA 09/15/2023 Turbid  Clear Final    pH, UA 09/15/2023 6.0  5.0 to 8.0 pH Units Final    Leukocytes, UA 09/15/2023 Negative  Negative Final    Nitrites, UA 09/15/2023 Negative  Negative Final    Protein, UA 09/15/2023 50 (A)  Negative Final    Glucose, UA 09/15/2023 Normal  Normal mg/dL Final    Ketones, UA 09/15/2023 Negative  Negative mg/dL Final    Urobilinogen, UA 09/15/2023 Normal  0.2, 1.0, Normal mg/dL Final    Bilirubin, UA 09/15/2023 Negative  Negative Final    Blood, UA 09/15/2023 Negative  Negative Final    Specific Gravity, UA 09/15/2023 1.032 (H)  <=1.030 Final    WBC, UA 09/15/2023 4  <=5 /hpf Final    RBC, UA 09/15/2023 2  <=3 /hpf Final    Bacteria, UA 09/15/2023 Moderate (A)  None Seen /hpf Final    Squamous Epithelial Cells, UA 09/15/2023 Moderate (A)  None Seen /HPF Final    Mucous 09/15/2023 Few (A)  None Seen /LPF Final    Culture, Urine 09/15/2023 Skin/Urogenital Marissa Isolated, no further workup.   Final   Lab Visit on 09/15/2023   Component Date Value Ref Range Status    Hemoglobin A1C 09/15/2023 5.7  4.5 - 6.6 % Final      Normal:               <5.7%  Pre-Diabetic:       5.7% to 6.4%  Diabetic:             >6.4%  Diabetic Goal:     <7%    Estimated Average Glucose 09/15/2023 104  mg/dL Final    Triglycerides 09/15/2023 139  35 - 150 mg/dL  Final      Normal:  <150 mg/dL  Borderline High: 150-199 mg/dL  High:   200-499 mg/dL  Very High:  >=500    Cholesterol 09/15/2023 190  0 - 200 mg/dL Final      <200 mg/dL:  Desirable  200-240 mg/dL: Borderline High  >240 mg/dL:  High    HDL Cholesterol 09/15/2023 39 (L)  40 - 60 mg/dL Final      <40 mg/dL: Low HDL  40-60 mg/dL: Normal  >60 mg/dL: Desirable    Cholesterol/HDL Ratio (Risk Factor) 09/15/2023 4.9   Final    Non-HDL 09/15/2023 151  mg/dL Final    LDL Calculated 09/15/2023 123  mg/dL Final    Unable to calculate due to one of the following values:  Cholesterol <5  HDL Cholesterol <5  Triglycerides <10 or >400    LDL/HDL 09/15/2023 3.2   Final    Unable to calculate due to one of the following values:  Cholesterol <5  HDL Cholesterol <5  Triglycerides <10 or >400    VLDL 09/15/2023 28  mg/dL Final    Sodium 09/15/2023 137  136 - 145 mmol/L Final    Potassium 09/15/2023 4.0  3.5 - 5.1 mmol/L Final    Chloride 09/15/2023 107  98 - 107 mmol/L Final    CO2 09/15/2023 27  21 - 32 mmol/L Final    Anion Gap 09/15/2023 7  7 - 16 mmol/L Final    Glucose 09/15/2023 181 (H)  74 - 106 mg/dL Final    BUN 09/15/2023 13  7 - 18 mg/dL Final    Creatinine 09/15/2023 0.98  0.55 - 1.02 mg/dL Final    BUN/Creatinine Ratio 09/15/2023 13  6 - 20 Final    Calcium 09/15/2023 8.8  8.5 - 10.1 mg/dL Final    Total Protein 09/15/2023 7.3  6.4 - 8.2 g/dL Final    Albumin 09/15/2023 3.6  3.5 - 5.0 g/dL Final    Globulin 09/15/2023 3.7  2.0 - 4.0 g/dL Final    A/G Ratio 09/15/2023 1.0   Final    Bilirubin, Total 09/15/2023 0.5  >0.0 - 1.2 mg/dL Final    Alk Phos 09/15/2023 79  55 - 142 U/L Final    ALT 09/15/2023 37  13 - 56 U/L Final    AST 09/15/2023 20  15 - 37 U/L Final    eGFR 09/15/2023 60  >=60 mL/min/1.73m2 Final    TSH 09/15/2023 2.000  0.358 - 3.740 uIU/mL Final    Vitamin D 25-Hydroxy, Blood 09/15/2023 28.5  ng/mL Final    Vitamin D 25-OH Adult Reference Values:  Deficiency: <20 ng/mL  Insufficiency: 20 - <30  ng/mL  Sufficiency: 30 -100 ng/mL    Vitamin D 25-OH Pediatric Reference Values:  Deficiency: <15 ng/mL  Insufficiency: 15 - <20 ng/mL  Sufficiency: 20 - 100 ng/mL    Free T3 09/15/2023 2.22  2.18 - 3.98 pg/mL Final    Free T4 09/15/2023 1.21  0.76 - 1.46 ng/dL Final    Thyroid-Stimulating Immunoglob 09/15/2023 <1.0  <=1.3 TSI index Final       Test Performed by:  [facility]  [address] [email or web address] [address]  : Ubaldo Newton M.D. Ph.D.; CLIA# [id number]    WBC 09/15/2023 5.75  4.50 - 11.00 K/uL Final    RBC 09/15/2023 4.95  4.20 - 5.40 M/uL Final    Hemoglobin 09/15/2023 14.5  12.0 - 16.0 g/dL Final    Hematocrit 09/15/2023 43.8  38.0 - 47.0 % Final    MCV 09/15/2023 88.5  80.0 - 96.0 fL Final    MCH 09/15/2023 29.3  27.0 - 31.0 pg Final    MCHC 09/15/2023 33.1  32.0 - 36.0 g/dL Final    RDW 09/15/2023 13.7  11.5 - 14.5 % Final    Platelet Count 09/15/2023 277  150 - 400 K/uL Final    MPV 09/15/2023 10.1  9.4 - 12.4 fL Final    Neutrophils % 09/15/2023 62.6  53.0 - 65.0 % Final    Lymphocytes % 09/15/2023 28.5  27.0 - 41.0 % Final    Monocytes % 09/15/2023 5.4  2.0 - 6.0 % Final    Eosinophils % 09/15/2023 2.3  1.0 - 4.0 % Final    Basophils % 09/15/2023 0.9  0.0 - 1.0 % Final    Immature Granulocytes % 09/15/2023 0.3  0.0 - 0.4 % Final    nRBC, Auto 09/15/2023 0.0  <=0.0 % Final    Neutrophils, Abs 09/15/2023 3.60  1.80 - 7.70 K/uL Final    Lymphocytes, Absolute 09/15/2023 1.64  1.00 - 4.80 K/uL Final    Monocytes, Absolute 09/15/2023 0.31  0.00 - 0.80 K/uL Final    Eosinophils, Absolute 09/15/2023 0.13  0.00 - 0.50 K/uL Final    Basophils, Absolute 09/15/2023 0.05  0.00 - 0.20 K/uL Final    Immature Granulocytes, Absolute 09/15/2023 0.02  0.00 - 0.04 K/uL Final    nRBC, Absolute 09/15/2023 0.00  <=0.00 x10e3/uL Final    Diff Type 09/15/2023 Auto   Final            Objective   Blood pressure (!) 156/75, pulse 76, temperature 97.8 °F (36.6 °C),  "temperature source Oral, resp. rate 18, height 5' 6" (1.676 m), weight 106.6 kg (235 lb), SpO2 (!) 93 %.    Physical Exam  Constitutional:       Appearance: Normal appearance.   HENT:      Head: Normocephalic and atraumatic.      Right Ear: External ear normal.      Left Ear: External ear normal.      Nose: Nose normal.      Mouth/Throat:      Mouth: Mucous membranes are moist.   Eyes:      Conjunctiva/sclera: Conjunctivae normal.      Pupils: Pupils are equal, round, and reactive to light.   Cardiovascular:      Rate and Rhythm: Normal rate and regular rhythm.      Pulses: Normal pulses.      Heart sounds: Normal heart sounds.   Pulmonary:      Effort: Pulmonary effort is normal.      Breath sounds: Normal breath sounds.   Abdominal:      General: Abdomen is flat.      Palpations: Abdomen is soft.   Musculoskeletal:         General: Normal range of motion.      Cervical back: Normal range of motion and neck supple.   Skin:     General: Skin is warm and dry.   Neurological:      General: No focal deficit present.      Mental Status: She is alert and oriented to person, place, and time.   Psychiatric:         Mood and Affect: Mood normal.         Behavior: Behavior normal.         Thought Content: Thought content normal.         Judgment: Judgment normal.            Assessment and Plan     1. Postablative hypothyroidism  -     levothyroxine (SYNTHROID, LEVOTHROID) 175 MCG tablet; Take 1 tablet (175 mcg total) by mouth before breakfast.  Dispense: 90 tablet; Refill: 3    2. Need for vaccination  -     Influenza (FLUAD) - Quadrivalent (Adjuvanted) *Preferred* (65+) (PF)    3. Mixed hyperlipidemia        RTC prn. Will try increased thyroid dose.          Follow up in about 1 year (around 9/27/2024) for for check up and lab/ check TSH in 2 months. .    "

## 2023-11-28 RX ORDER — EZETIMIBE 10 MG/1
TABLET ORAL
Qty: 90 TABLET | Refills: 3 | Status: SHIPPED | OUTPATIENT
Start: 2023-11-28

## 2023-12-09 DIAGNOSIS — Z71.89 COMPLEX CARE COORDINATION: ICD-10-CM

## 2024-02-21 ENCOUNTER — OFFICE VISIT (OUTPATIENT)
Dept: DERMATOLOGY | Facility: CLINIC | Age: 79
End: 2024-02-21
Payer: MEDICARE

## 2024-02-21 VITALS — HEIGHT: 66 IN | WEIGHT: 235 LBS | RESPIRATION RATE: 18 BRPM | BODY MASS INDEX: 37.77 KG/M2

## 2024-02-21 DIAGNOSIS — L40.9 PSORIASIS: Primary | ICD-10-CM

## 2024-02-21 DIAGNOSIS — Z79.899 HIGH RISK MEDICATION USE: ICD-10-CM

## 2024-02-21 DIAGNOSIS — L40.9 PSORIASIS: ICD-10-CM

## 2024-02-21 PROCEDURE — 99214 OFFICE O/P EST MOD 30 MIN: CPT | Mod: ,,, | Performed by: DERMATOLOGY

## 2024-02-21 RX ORDER — BETAMETHASONE DIPROPIONATE 0.5 MG/G
CREAM TOPICAL
Qty: 45 G | Refills: 3 | Status: SHIPPED | OUTPATIENT
Start: 2024-02-21

## 2024-02-21 NOTE — PROGRESS NOTES
[facility]   Ning Mccurdy MD    Patient Name: Stephanie Radford  Patient YOB: 1945   Date of Service: 2/21/24    CC: Follow-up Psoriasis    HPI: Stephanie Radford is a 78 y.o. female here today for follow-up of psoriasis, last seen 2/2023.  Previous treatments include betamethasone dipropionate.  Overall, the psoriasis is stable.  Treatment plan was followed as directed.    Past Medical History:   Diagnosis Date    Hypothyroidism      Past Surgical History:   Procedure Laterality Date    TUBAL LIGATION       Review of patient's allergies indicates:   Allergen Reactions    Penicillins Hives    Doxycycline      GI issues    Tylenol [acetaminophen]     Clindamycin Rash    Rocephin [ceftriaxone] Rash       Current Outpatient Medications:     ezetimibe (ZETIA) 10 mg tablet, TAKE 1 TABLET BY MOUTH EVERY DAY, Disp: 90 tablet, Rfl: 3    ascorbic acid, vitamin C, (VITAMIN C) 100 MG tablet, Take 100 mg by mouth once daily., Disp: , Rfl:     aspirin (ECOTRIN) 81 MG EC tablet, Take 81 mg by mouth once daily., Disp: , Rfl:     betamethasone dipropionate 0.05 % cream, Apply to AA on body BID PRN flares tapering with improvement, Disp: 45 g, Rfl: 3    cholecalciferol, vitamin D3, 125 mcg (5,000 unit) Tab, Take 5,000 Units by mouth once daily., Disp: , Rfl:     clobetasoL (TEMOVATE) 0.05 % cream, Apply to AA on body BID PRN flares tapering with improvement, Disp: 60 g, Rfl: 3    coenzyme Q10 100 mg capsule, Take 200 mg by mouth once daily., Disp: , Rfl:     cyanocobalamin (VITAMIN B-12) 1000 MCG tablet, Take 1,000 mcg by mouth once daily., Disp: , Rfl:     levocetirizine (XYZAL) 5 MG tablet, Take 5 mg by mouth every evening. Takes as needed, Disp: , Rfl:     levothyroxine (SYNTHROID, LEVOTHROID) 175 MCG tablet, Take 1 tablet (175 mcg total) by mouth before breakfast., Disp: 90 tablet, Rfl: 3    multivit-min/iron/folic/lutein (CENTRUM SILVER WOMEN ORAL), Take 1 tablet by mouth Daily., Disp: , Rfl:     ROS: A  focused review of systems was obtained and negative.     Exam: A focused skin exam was performed. All areas examined were normal except as mentioned in the assessment and plan below.  General Appearance of the patient is well developed and well nourished.  Orientation: alert and oriented x 3.  Mood and affect: pleasant.    Assessment:   The primary encounter diagnosis was Psoriasis. Diagnoses of Psoriasis and High risk medication use were also pertinent to this visit.    Plan:   Medications Ordered This Encounter   Medications    betamethasone dipropionate 0.05 % cream     Sig: Apply to AA on body BID PRN flares tapering with improvement     Dispense:  45 g     Refill:  3     Plaque Psoriasis  - psoriasiform plaques with micaceous scale  Status: Inadequately controlled      Plan: Counseling  I counseled the patient regarding the following:  Skin care: Emollients, ambient sun exposure, shampoos with tar, selenium or zinc pyrithione can improve psoriasis.  Expectations: Psoriasis is chronic in nature with periods of remissions and flares. Flares can be triggered by stress, infections (group A strep), certain medications and alcohol.  Contact office if: Psoriasis worsens, or fails to improve despite several months of treatment.    - recently flared but responding to topical steroids  - continue betamethasone   - Discussed Soriatane, patient declines at this time but will call if she needs systemic therapy in the future     High Risk Medication Monitoring (Z79.899) : The risks and benefits of the medication were reviewed in full with the patient. Should any side effects occur, the patient will stop the medication and contact me immediately.    Soriatane Counseling: I discussed with the patient the risks of acitretin including but not limited to hair loss, dry  lips/skin/eyes, liver damage, hyperlipidemia, depression/suicidal ideation, photosensitivity. Serious rare side  effects can include but are not limited to  pancreatitis, pseudotumor cerebri, bony changes, clot  formation/stroke/heart attack. Patient understands that alcohol is contraindicated since it can result in liver toxicity  and significantly prolong the elimination of the drug by many years. Patient understands that this drug is not  meant for any woman of childbearing age or who plans to get pregnant since it is teratogenic and has a long drug  elimination half life.      Follow up in about 1 year (around 2/21/2025) for Psoriasis .    Ning Mccurdy MD

## 2024-07-09 DIAGNOSIS — Z71.89 COMPLEX CARE COORDINATION: ICD-10-CM

## 2024-07-25 ENCOUNTER — TELEPHONE (OUTPATIENT)
Dept: FAMILY MEDICINE | Facility: CLINIC | Age: 79
End: 2024-07-25
Payer: MEDICARE

## 2024-07-31 ENCOUNTER — APPOINTMENT (OUTPATIENT)
Dept: RADIOLOGY | Facility: CLINIC | Age: 79
End: 2024-07-31
Attending: NURSE PRACTITIONER
Payer: MEDICARE

## 2024-07-31 ENCOUNTER — OFFICE VISIT (OUTPATIENT)
Dept: FAMILY MEDICINE | Facility: CLINIC | Age: 79
End: 2024-07-31
Payer: MEDICARE

## 2024-07-31 VITALS
DIASTOLIC BLOOD PRESSURE: 84 MMHG | WEIGHT: 237.88 LBS | OXYGEN SATURATION: 96 % | BODY MASS INDEX: 38.23 KG/M2 | RESPIRATION RATE: 18 BRPM | HEIGHT: 66 IN | HEART RATE: 82 BPM | SYSTOLIC BLOOD PRESSURE: 155 MMHG

## 2024-07-31 DIAGNOSIS — K21.9 GASTROESOPHAGEAL REFLUX DISEASE WITHOUT ESOPHAGITIS: ICD-10-CM
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
## 2024-07-31 DIAGNOSIS — M79.671 ACUTE FOOT PAIN, RIGHT: ICD-10-CM

## 2024-07-31 DIAGNOSIS — H53.8 BLURRED VISION, BILATERAL: ICD-10-CM
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
## 2024-07-31 PROBLEM — Z12.31 SCREENING MAMMOGRAM FOR BREAST CANCER: Status: RESOLVED | Noted: 2022-10-03 | Resolved: 2024-07-31

## 2024-07-31 LAB
CREAT SERPL-MCNC: 0.81 MG/DL (ref 0.55–1.02)
EGFR (NO RACE VARIABLE) (RUSH/TITUS): 75 ML/MIN/1.73M2

## 2024-07-31 PROCEDURE — 71101 X-RAY EXAM UNILAT RIBS/CHEST: CPT | Mod: TC,RHCUB,RT | Performed by: NURSE PRACTITIONER

## 2024-07-31 PROCEDURE — 71101 X-RAY EXAM UNILAT RIBS/CHEST: CPT | Mod: 26,RT,, | Performed by: RADIOLOGY

## 2024-07-31 PROCEDURE — 73630 X-RAY EXAM OF FOOT: CPT | Mod: TC,RHCUB,RT | Performed by: NURSE PRACTITIONER

## 2024-07-31 PROCEDURE — 82565 ASSAY OF CREATININE: CPT | Mod: ,,, | Performed by: CLINICAL MEDICAL LABORATORY

## 2024-07-31 PROCEDURE — 99214 OFFICE O/P EST MOD 30 MIN: CPT | Mod: ,,, | Performed by: NURSE PRACTITIONER

## 2024-07-31 PROCEDURE — 73630 X-RAY EXAM OF FOOT: CPT | Mod: TC,RHCUB,LT | Performed by: NURSE PRACTITIONER

## 2024-07-31 RX ORDER — IBUPROFEN 600 MG/1
600 TABLET ORAL EVERY 6 HOURS PRN
Qty: 60 TABLET | Refills: 0 | Status: SHIPPED | OUTPATIENT
Start: 2024-07-31 | End: 2024-08-15

## 2024-07-31 RX ORDER — PANTOPRAZOLE SODIUM 40 MG/1
40 TABLET, DELAYED RELEASE ORAL DAILY
Qty: 30 TABLET | Refills: 11 | Status: SHIPPED | OUTPATIENT
Start: 2024-07-31 | End: 2025-07-31

## 2024-07-31 NOTE — PROGRESS NOTES
"Subjective:       Patient ID: Stephanie Radford is a 78 y.o. female.    Chief Complaint: Fall (Pt stated that she fell and hurts both feet and pt stated that she is hurting below her ribs , pt stated that she blacked out before falling )    Patient presents to clinic for evaluation after fall over 7 days ago. Denies going to ED for work up at time of fall. Mentions losing balance while walking through yard, stepping in whole. States she fell to the ground hitting right side of body. She is unsure if she hit her head. She does report LOC at time of fall. States she "blacked out" for "seconds". Denies confusion. However she does report change in vision since the fall.   Pain to bilateral feet with bruising, right rib area.    Active Problem List with Overview Notes    Diagnosis Date Noted    Blurred vision, bilateral 07/31/2024    Fall 07/31/2024    Mixed hyperlipidemia 11/18/2023    Vitamin D deficiency 10/03/2022    Psoriasis 10/03/2022    Encounter for long-term (current) use of other medications 10/03/2022    Acquired hypothyroidism 10/03/2022    Menopause 10/03/2022        Review of Systems   Constitutional:  Negative for chills, fatigue and fever.   HENT:  Negative for congestion, hearing loss, postnasal drip, rhinorrhea, sore throat, tinnitus and voice change.    Respiratory:  Negative for apnea, cough, choking, chest tightness and shortness of breath.    Cardiovascular:  Negative for chest pain, palpitations and leg swelling.   Gastrointestinal:  Negative for abdominal pain, constipation, diarrhea and nausea.   Genitourinary:  Negative for difficulty urinating.   Musculoskeletal:  Positive for arthralgias and myalgias.   Neurological:  Negative for dizziness, syncope, weakness and headaches.   Psychiatric/Behavioral:  Negative for sleep disturbance.         Objective:      Vitals:    07/31/24 0951   BP: (!) 155/84   Pulse:    Resp:       Physical Exam  Constitutional:       Appearance: Normal appearance. She is " well-developed. She is obese.   HENT:      Head: Normocephalic.      Right Ear: External ear normal.      Left Ear: External ear normal.      Nose: Nose normal.      Mouth/Throat:      Lips: Pink.      Mouth: Mucous membranes are moist.      Pharynx: Oropharynx is clear.      Tonsils: No tonsillar exudate or tonsillar abscesses.   Eyes:      General: Lids are normal.      Pupils: Pupils are equal, round, and reactive to light.   Cardiovascular:      Rate and Rhythm: Normal rate and regular rhythm.      Pulses: Normal pulses.      Heart sounds: Normal heart sounds.   Pulmonary:      Effort: Pulmonary effort is normal.      Breath sounds: Normal breath sounds.   Abdominal:      Palpations: Abdomen is soft.   Musculoskeletal:         General: Normal range of motion.        Arms:       Cervical back: Normal range of motion.      Right foot: Swelling, tenderness and bony tenderness present. Normal pulse.      Left foot: Tenderness present. Normal pulse.        Legs:       Comments: Tenderness with palpation to right ribs, echymosis and swelling to right posterior foot, eccymosis and swelling to left foot.   Skin:     General: Skin is warm and dry.   Neurological:      Mental Status: She is alert and oriented to person, place, and time.   Psychiatric:         Attention and Perception: Attention normal.         Mood and Affect: Mood normal.         Speech: Speech normal.         Behavior: Behavior normal. Behavior is cooperative.       Assessment:       1. Fall, initial encounter    2. Blurred vision, bilateral    3. Gastroesophageal reflux disease without esophagitis        Plan:   Xrays- today Call results.  CT head- Encourage patient to go to ED for further work up and CT to bed done in acute setting. She declines  Follow up PRN  Problem List Items Addressed This Visit          Ophtho    Blurred vision, bilateral    Relevant Orders    CT Head W Wo Contrast    Creatinine, serum       Orthopedic    Fall - Primary     Relevant Medications    ibuprofen (ADVIL,MOTRIN) 600 MG tablet    Other Relevant Orders    X-Ray Foot Complete 3 view Right    X-Ray Foot Complete 3 view Left    X-Ray Ribs 2 View Right    CT Head W Wo Contrast    Creatinine, serum     Other Visit Diagnoses       Gastroesophageal reflux disease without esophagitis        Relevant Medications    pantoprazole (PROTONIX) 40 MG tablet            Health Maintenance:  Health Maintenance Topics with due status: Not Due       Topic Last Completion Date    DEXA Scan 10/27/2022    Hemoglobin A1c (Prediabetes) 09/15/2023    Lipid Panel 09/15/2023    Influenza Vaccine 09/27/2023           Amanda Hughes   [facility]   7/31/24

## 2024-08-01 ENCOUNTER — OFFICE VISIT (OUTPATIENT)
Dept: ORTHOPEDICS | Facility: CLINIC | Age: 79
End: 2024-08-01
Payer: MEDICARE

## 2024-08-01 DIAGNOSIS — S92.355A CLOSED NONDISPLACED FRACTURE OF FIFTH METATARSAL BONE OF LEFT FOOT, INITIAL ENCOUNTER: Primary | ICD-10-CM

## 2024-08-01 DIAGNOSIS — M79.671 ACUTE FOOT PAIN, RIGHT: ICD-10-CM

## 2024-08-01 PROCEDURE — 99999 PR PBB SHADOW E&M-EST. PATIENT-LVL III: CPT | Mod: PBBFAC,,,

## 2024-08-01 PROCEDURE — 99213 OFFICE O/P EST LOW 20 MIN: CPT | Mod: PBBFAC

## 2024-08-01 NOTE — PROGRESS NOTES
CC: No chief complaint on file.         Stephanie Radford is a 78 y.o. female seen today for No chief complaint on file.  Patient tripped and fell in her yard a proximally 2 weeks ago.  She went to family medicine clinic yesterday where x-rays of the right foot showed a fracture of the base of the 5th metatarsal of her right foot.  She was referred to ortho for further evaluation.  She is ambulating independently wearing tennis shoes.  She continues to have pain and swelling of her right foot.  She was previously prescribed 800 mg ibuprofen.  No other complaints today.      PAST MEDICAL HISTORY:   Past Medical History:   Diagnosis Date    Hypothyroidism           PAST SURGICAL HISTORY:   Past Surgical History:   Procedure Laterality Date    TUBAL LIGATION            ALLERGIES:   Review of patient's allergies indicates:   Allergen Reactions    Penicillins Hives    Doxycycline      GI issues    Tylenol [acetaminophen]     Clindamycin Rash    Rocephin [ceftriaxone] Rash        MEDICATIONS :    Current Outpatient Medications:     ezetimibe (ZETIA) 10 mg tablet, TAKE 1 TABLET BY MOUTH EVERY DAY (Patient not taking: Reported on 7/31/2024), Disp: 90 tablet, Rfl: 3    ascorbic acid, vitamin C, (VITAMIN C) 100 MG tablet, Take 100 mg by mouth once daily., Disp: , Rfl:     aspirin (ECOTRIN) 81 MG EC tablet, Take 81 mg by mouth once daily., Disp: , Rfl:     betamethasone dipropionate 0.05 % cream, Apply to AA on body BID PRN flares tapering with improvement, Disp: 45 g, Rfl: 3    cholecalciferol, vitamin D3, 125 mcg (5,000 unit) Tab, Take 5,000 Units by mouth once daily., Disp: , Rfl:     clobetasoL (TEMOVATE) 0.05 % cream, Apply to AA on body BID PRN flares tapering with improvement, Disp: 60 g, Rfl: 3    coenzyme Q10 100 mg capsule, Take 200 mg by mouth once daily., Disp: , Rfl:     cyanocobalamin (VITAMIN B-12) 1000 MCG tablet, Take 1,000 mcg by mouth once daily., Disp: , Rfl:     ibuprofen (ADVIL,MOTRIN)  600 MG tablet, Take 1 tablet (600 mg total) by mouth every 6 (six) hours as needed for Pain., Disp: 60 tablet, Rfl: 0    levocetirizine (XYZAL) 5 MG tablet, Take 5 mg by mouth every evening. Takes as needed, Disp: , Rfl:     levothyroxine (SYNTHROID, LEVOTHROID) 175 MCG tablet, Take 1 tablet (175 mcg total) by mouth before breakfast., Disp: 90 tablet, Rfl: 3    multivit-min/iron/folic/lutein (CENTRUM SILVER WOMEN ORAL), Take 1 tablet by mouth Daily., Disp: , Rfl:     pantoprazole (PROTONIX) 40 MG tablet, Take 1 tablet (40 mg total) by mouth once daily., Disp: 30 tablet, Rfl: 11     SOCIAL HISTORY:   Social History     Socioeconomic History    Marital status:    Occupational History    Occupation: retired   Tobacco Use    Smoking status: Never     Passive exposure: Past    Smokeless tobacco: Never   Substance and Sexual Activity    Alcohol use: Yes     Alcohol/week: 2.0 standard drinks of alcohol     Types: 2 Cans of beer per week    Drug use: Never    Sexual activity: Not Currently        FAMILY HISTORY: No family history on file.       PHYSICAL EXAM:      There were no vitals filed for this visit.  There is no height or weight on file to calculate BMI.    GENERAL: Well-developed, well-nourished female . The patient is alert, oriented and cooperative.    HEENT:  Normocephalic, atraumatic.  Extraocular movements are intact bilaterally.     NECK:  Nontender with good range of motion.    LUNGS:  Clear to auscultation bilaterally.    HEART:  Regular rate and rhythm.     ABDOMEN:  Soft, non-tender, non-distended.      EXTREMITIES:  Right foot was skin clean dry and intact, tenderness to palpation of the base of the 5th metatarsal, good range of motion with dorsiflexion and plantar flexion of foot, palpable dorsalis pedis pulse, neurovascularly intact  Bilateral lower extremity edema      RADIOGRAPHIC FINDINGS:   X-Ray Foot Complete 3 view Left    Result Date: 7/31/2024  EXAMINATION: XR FOOT COMPLETE 3 VIEW LEFT  CLINICAL HISTORY: Unspecified fall, initial encounter TECHNIQUE: Left foot, AP lateral and oblique views: COMPARISON: None. FINDINGS: No acute fractures are seen.  Degenerative changes are present in the midfoot and involving the toes and there is a calcaneal spur at the insertion site of the plantar fascia.     Chronic changes but no fractures are seen. Place of service: [facility] Electronically signed by: Ryann Edgar Date:    07/31/2024 Time:    12:12    X-Ray Foot Complete 3 view Right    Result Date: 7/31/2024  EXAMINATION: XR FOOT COMPLETE 3 VIEW RIGHT CLINICAL HISTORY: Unspecified fall, initial encounter TECHNIQUE: Right foot, AP lateral and oblique views: COMPARISON: None. FINDINGS: There is an acute transverse fracture across the base of the 5th metatarsal.  No additional fractures are seen elsewhere in the foot.  There is a calcaneal spur at the insertion site of the plantar fascia.     There is an acute transverse fracture across the base of the 5th metatarsal. Place of service: [facility] Electronically signed by: Ryann Edgar Date:    07/31/2024 Time:    12:10    XR Ribs Min 3 Views w/PA Chest Right    Result Date: 7/31/2024  EXAMINATION: XR RIBS MIN 3 VIEWS W/ PA CHEST RIGHT CLINICAL HISTORY: Unspecified fall, initial encounter TECHNIQUE: Right ribs with PA chest, three views: COMPARISON: Exam of the chest dated 07/12/2023 FINDINGS: No rib fractures are seen.  The right lung is clear.  The cardiac size is upper limits of normal and unchanged.  Degenerative changes are present in the thoracic spine.     No fractures are seen. Place of service: [facility] Electronically signed by: Ryann Edgar Date:    07/31/2024 Time:    12:07       Patient Active Problem List    Diagnosis Date Noted    Blurred vision, bilateral 07/31/2024    Fall 07/31/2024    Mixed hyperlipidemia 11/18/2023    Vitamin D deficiency 10/03/2022    Psoriasis 10/03/2022    Encounter for long-term  (current) use of other medications 10/03/2022    Acquired hypothyroidism 10/03/2022    Menopause 10/03/2022     IMPRESSION AND PLAN:  Fracture of the base of the 5th metatarsal right foot.  Personally reviewed previous x-rays of the right foot with a acute transverse fracture across the base of the 5th metatarsal, calcaneal spurring at the insertion site of the plantar fascia, no other additional fractures or dislocation.  Patient placed in a tall walking boot today.  Prescription printed for knee walker.  Follow up with Dr. Fuentes in 1 week.    No follow-ups on file.       Elizabet Meadows PA-C      (Subject to voice recognition error, transcription service not allowed)

## 2024-08-06 ENCOUNTER — HOSPITAL ENCOUNTER (OUTPATIENT)
Dept: RADIOLOGY | Facility: HOSPITAL | Age: 79
Discharge: HOME | End: 2024-08-06
Attending: NURSE PRACTITIONER
Payer: MEDICARE
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
## 2024-08-06 DIAGNOSIS — H53.8 BLURRED VISION, BILATERAL: ICD-10-CM

## 2024-08-06 PROCEDURE — 70470 CT HEAD/BRAIN W/O & W/DYE: CPT | Mod: TC

## 2024-08-06 PROCEDURE — 25500020 PHARM REV CODE 255: Performed by: NURSE PRACTITIONER

## 2024-08-06 RX ORDER — IOPAMIDOL 755 MG/ML
100 INJECTION, SOLUTION INTRAVASCULAR
Status: COMPLETED | OUTPATIENT
Start: 2024-08-06 | End: 2024-08-06

## 2024-08-06 RX ADMIN — IOPAMIDOL 100 ML: 755 INJECTION, SOLUTION INTRAVENOUS at 01:08

## 2024-08-08 DIAGNOSIS — S92.355A CLOSED NONDISPLACED FRACTURE OF FIFTH METATARSAL BONE OF LEFT FOOT, INITIAL ENCOUNTER: Primary | ICD-10-CM

## 2024-08-12 ENCOUNTER — OFFICE VISIT (OUTPATIENT)
Dept: ORTHOPEDICS | Facility: CLINIC | Age: 79
End: 2024-08-12
Payer: MEDICARE

## 2024-08-12 ENCOUNTER — HOSPITAL ENCOUNTER (OUTPATIENT)
Dept: RADIOLOGY | Facility: HOSPITAL | Age: 79
Discharge: HOME | End: 2024-08-12
Attending: ORTHOPAEDIC SURGERY
Payer: MEDICARE

## 2024-08-12 DIAGNOSIS — S92.355A CLOSED NONDISPLACED FRACTURE OF FIFTH METATARSAL BONE OF LEFT FOOT, INITIAL ENCOUNTER: Primary | ICD-10-CM

## 2024-08-12 DIAGNOSIS — S92.352A CLOSED DISPLACED FRACTURE OF FIFTH METATARSAL BONE OF LEFT FOOT, INITIAL ENCOUNTER: ICD-10-CM

## 2024-08-12 DIAGNOSIS — S92.355A CLOSED NONDISPLACED FRACTURE OF FIFTH METATARSAL BONE OF LEFT FOOT, INITIAL ENCOUNTER: ICD-10-CM

## 2024-08-12 PROCEDURE — 99213 OFFICE O/P EST LOW 20 MIN: CPT | Mod: S$PBB,57,, | Performed by: ORTHOPAEDIC SURGERY

## 2024-08-12 PROCEDURE — 99213 OFFICE O/P EST LOW 20 MIN: CPT | Mod: PBBFAC,25 | Performed by: ORTHOPAEDIC SURGERY

## 2024-08-12 PROCEDURE — 73630 X-RAY EXAM OF FOOT: CPT | Mod: TC,RT

## 2024-08-12 PROCEDURE — 28470 CLTX METATARSAL FX WO MNP EA: CPT | Mod: S$PBB,LT,, | Performed by: ORTHOPAEDIC SURGERY

## 2024-08-12 PROCEDURE — 99999 PR PBB SHADOW E&M-EST. PATIENT-LVL III: CPT | Mod: PBBFAC,,, | Performed by: ORTHOPAEDIC SURGERY

## 2024-08-12 PROCEDURE — 28470 CLTX METATARSAL FX WO MNP EA: CPT | Mod: PBBFAC | Performed by: ORTHOPAEDIC SURGERY

## 2024-08-12 RX ORDER — TRAMADOL HYDROCHLORIDE 50 MG/1
50 TABLET ORAL EVERY 6 HOURS PRN
Qty: 28 TABLET | Refills: 0 | Status: SHIPPED | OUTPATIENT
Start: 2024-08-12

## 2024-08-12 NOTE — PROGRESS NOTES
Radiology Interpretation        Patient Name: Stephanie Radford  Date: 8/12/2024  YOB: 1945  MRN# [id number]        ORDERING DIAGNOSIS:    Encounter Diagnoses   Name Primary?    Closed nondisplaced fracture of fifth metatarsal bone of left foot, initial encounter Yes    Closed displaced fracture of fifth metatarsal bone of left foot, initial encounter            Three views AP lateral oblique left foot skeletally mature individual there is a fracture of the 5th metatarsal base there is some slight displacement from previous x-rays noted.  No bony lesions no angulation impression fracture left 5th metatarsal base slight displacement            Rudy Fuentes MD                      What Type Of Note Output Would You Prefer (Optional)?: Standard Output What Is The Reason For Today's Visit?: Full Body Skin Examination with No Concerns What Is The Reason For Today's Visit? (Being Monitored For X): concerning skin lesions on an annual basis How Severe Are Your Spot(S)?: mild

## 2024-08-12 NOTE — PROGRESS NOTES
Patient is here for 5th metatarsal fracture on left.  She was wearing a boot.  She has put some weight on her foot without her boot on.  She is having some pain over the base of the 5th metatarsal.  She denies any numbness or tingling.  She is having tenderness when she stands.  Swelling is improving.  She does have tenderness over the base of the 5th metatarsal.  Her x-rays show some slight displacement.  Keep her in a boot.  Let her heel down weightbear.  I will follow up in 3 weeks.  She is almost 3 weeks out from her injury.  Wrote her for tramadol for pain.

## 2024-09-04 DIAGNOSIS — S92.355A CLOSED NONDISPLACED FRACTURE OF FIFTH METATARSAL BONE OF LEFT FOOT, INITIAL ENCOUNTER: Primary | ICD-10-CM

## 2024-09-09 ENCOUNTER — HOSPITAL ENCOUNTER (OUTPATIENT)
Dept: RADIOLOGY | Facility: HOSPITAL | Age: 79
Discharge: HOME | End: 2024-09-09
Attending: ORTHOPAEDIC SURGERY
Payer: MEDICARE

## 2024-09-09 ENCOUNTER — OFFICE VISIT (OUTPATIENT)
Dept: ORTHOPEDICS | Facility: CLINIC | Age: 79
End: 2024-09-09
Payer: MEDICARE

## 2024-09-09 DIAGNOSIS — S92.355A CLOSED NONDISPLACED FRACTURE OF FIFTH METATARSAL BONE OF LEFT FOOT, INITIAL ENCOUNTER: ICD-10-CM

## 2024-09-09 DIAGNOSIS — M79.671 ACUTE FOOT PAIN, RIGHT: Primary | ICD-10-CM

## 2024-09-09 DIAGNOSIS — S92.354A CLOSED NONDISPLACED FRACTURE OF FIFTH METATARSAL BONE OF RIGHT FOOT, INITIAL ENCOUNTER: ICD-10-CM

## 2024-09-09 PROCEDURE — 99024 POSTOP FOLLOW-UP VISIT: CPT | Mod: ,,, | Performed by: ORTHOPAEDIC SURGERY

## 2024-09-09 PROCEDURE — 73630 X-RAY EXAM OF FOOT: CPT | Mod: 26,RT,, | Performed by: ORTHOPAEDIC SURGERY

## 2024-09-09 PROCEDURE — 99213 OFFICE O/P EST LOW 20 MIN: CPT | Mod: PBBFAC,25 | Performed by: ORTHOPAEDIC SURGERY

## 2024-09-09 PROCEDURE — 73630 X-RAY EXAM OF FOOT: CPT | Mod: TC,RT

## 2024-09-09 PROCEDURE — 99999 PR PBB SHADOW E&M-EST. PATIENT-LVL III: CPT | Mod: PBBFAC,,, | Performed by: ORTHOPAEDIC SURGERY

## 2024-09-09 NOTE — PROGRESS NOTES
Radiology Interpretation        Patient Name: Stephanie Radford  Date: 9/9/2024  YOB: 1945  MRN# [id number]        ORDERING DIAGNOSIS:    Encounter Diagnosis   Name Primary?    Acute foot pain, right Yes      Three views AP lateral oblique right foot skeletally mature individual there is a fracture of the base of the 5th metatarsal.  Slight displacement noted.  No shift from previous views.  No bony lesions.  Impression fracture 5th metatarsal base on the right slight displacement                 Rudy Fuentes MD

## 2024-09-09 NOTE — PROGRESS NOTES
Patient is here for follow-up of her right 5th metatarsal base fracture.  Her x-rays show no shift from previous x-rays.  Neurovascularly she is intact distally.  Let her start weightbear as tolerates.  She will get out of the boot.  Neurovascularly she is intact distally.  I will follow up month.

## 2024-09-21 DIAGNOSIS — E89.0 POSTABLATIVE HYPOTHYROIDISM: Chronic | ICD-10-CM

## 2024-09-23 RX ORDER — LEVOTHYROXINE SODIUM 175 UG/1
175 TABLET ORAL
Qty: 90 TABLET | Refills: 3 | Status: SHIPPED | OUTPATIENT
Start: 2024-09-23 | End: 2025-09-23

## 2024-09-27 ENCOUNTER — OFFICE VISIT (OUTPATIENT)
Dept: FAMILY MEDICINE | Facility: CLINIC | Age: 79
End: 2024-09-27
Payer: MEDICARE

## 2024-09-27 VITALS
BODY MASS INDEX: 38.86 KG/M2 | HEIGHT: 66 IN | RESPIRATION RATE: 16 BRPM | SYSTOLIC BLOOD PRESSURE: 173 MMHG | DIASTOLIC BLOOD PRESSURE: 68 MMHG | TEMPERATURE: 98 F | WEIGHT: 241.81 LBS | OXYGEN SATURATION: 95 % | HEART RATE: 81 BPM

## 2024-09-27 DIAGNOSIS — E78.2 MIXED HYPERLIPIDEMIA: Primary | Chronic | ICD-10-CM

## 2024-09-27 DIAGNOSIS — Z11.59 NEED FOR HEPATITIS C SCREENING TEST: ICD-10-CM

## 2024-09-27 DIAGNOSIS — R73.03 PRE-DIABETES: Chronic | ICD-10-CM

## 2024-09-27 DIAGNOSIS — E89.0 POSTABLATIVE HYPOTHYROIDISM: Chronic | ICD-10-CM

## 2024-09-27 DIAGNOSIS — E55.9 VITAMIN D DEFICIENCY: Chronic | ICD-10-CM

## 2024-09-27 DIAGNOSIS — Z23 NEED FOR IMMUNIZATION AGAINST INFLUENZA: ICD-10-CM

## 2024-09-27 LAB
25(OH)D3 SERPL-MCNC: 38.6 NG/ML
ALBUMIN SERPL BCP-MCNC: 3.7 G/DL (ref 3.5–5)
ALBUMIN/GLOB SERPL: 1.1 {RATIO}
ALP SERPL-CCNC: 96 U/L (ref 55–142)
ALT SERPL W P-5'-P-CCNC: 46 U/L (ref 13–56)
ANION GAP SERPL CALCULATED.3IONS-SCNC: 11 MMOL/L (ref 7–16)
AST SERPL W P-5'-P-CCNC: 22 U/L (ref 15–37)
BASOPHILS # BLD AUTO: 0.05 K/UL (ref 0–0.2)
BASOPHILS NFR BLD AUTO: 0.8 % (ref 0–1)
BILIRUB SERPL-MCNC: 0.5 MG/DL (ref ?–1.2)
BUN SERPL-MCNC: 12 MG/DL (ref 7–18)
BUN/CREAT SERPL: 14 (ref 6–20)
CALCIUM SERPL-MCNC: 8.9 MG/DL (ref 8.5–10.1)
CHLORIDE SERPL-SCNC: 107 MMOL/L (ref 98–107)
CHOLEST SERPL-MCNC: 170 MG/DL (ref 0–200)
CHOLEST/HDLC SERPL: 4.6 {RATIO}
CO2 SERPL-SCNC: 26 MMOL/L (ref 21–32)
CREAT SERPL-MCNC: 0.87 MG/DL (ref 0.55–1.02)
DIFFERENTIAL METHOD BLD: ABNORMAL
EGFR (NO RACE VARIABLE) (RUSH/TITUS): 69 ML/MIN/1.73M2
EOSINOPHIL # BLD AUTO: 0.12 K/UL (ref 0–0.5)
EOSINOPHIL NFR BLD AUTO: 1.8 % (ref 1–4)
ERYTHROCYTE [DISTWIDTH] IN BLOOD BY AUTOMATED COUNT: 13.2 % (ref 11.5–14.5)
EST. AVERAGE GLUCOSE BLD GHB EST-MCNC: 123 MG/DL
GLOBULIN SER-MCNC: 3.5 G/DL (ref 2–4)
GLUCOSE SERPL-MCNC: 151 MG/DL (ref 74–106)
HBA1C MFR BLD HPLC: 5.9 % (ref 4.5–6.6)
HCT VFR BLD AUTO: 42.8 % (ref 38–47)
HCV AB SER QL: NORMAL
HDLC SERPL-MCNC: 37 MG/DL (ref 40–60)
HGB BLD-MCNC: 14.1 G/DL (ref 12–16)
IMM GRANULOCYTES # BLD AUTO: 0.01 K/UL (ref 0–0.04)
IMM GRANULOCYTES NFR BLD: 0.2 % (ref 0–0.4)
LDLC SERPL CALC-MCNC: 91 MG/DL
LDLC/HDLC SERPL: 2.5 {RATIO}
LYMPHOCYTES # BLD AUTO: 1.64 K/UL (ref 1–4.8)
LYMPHOCYTES NFR BLD AUTO: 24.8 % (ref 27–41)
MCH RBC QN AUTO: 29.3 PG (ref 27–31)
MCHC RBC AUTO-ENTMCNC: 32.9 G/DL (ref 32–36)
MCV RBC AUTO: 88.8 FL (ref 80–96)
MONOCYTES # BLD AUTO: 0.4 K/UL (ref 0–0.8)
MONOCYTES NFR BLD AUTO: 6.1 % (ref 2–6)
MPC BLD CALC-MCNC: 10.2 FL (ref 9.4–12.4)
NEUTROPHILS # BLD AUTO: 4.39 K/UL (ref 1.8–7.7)
NEUTROPHILS NFR BLD AUTO: 66.3 % (ref 53–65)
NONHDLC SERPL-MCNC: 133 MG/DL
NRBC # BLD AUTO: 0 X10E3/UL
NRBC, AUTO (.00): 0 %
PLATELET # BLD AUTO: 259 K/UL (ref 150–400)
POTASSIUM SERPL-SCNC: 3.7 MMOL/L (ref 3.5–5.1)
PROT SERPL-MCNC: 7.2 G/DL (ref 6.4–8.2)
RBC # BLD AUTO: 4.82 M/UL (ref 4.2–5.4)
SODIUM SERPL-SCNC: 140 MMOL/L (ref 136–145)
TRIGL SERPL-MCNC: 211 MG/DL (ref 35–150)
TSH SERPL DL<=0.005 MIU/L-ACNC: 0.2 UIU/ML (ref 0.36–3.74)
VLDLC SERPL-MCNC: 42 MG/DL
WBC # BLD AUTO: 6.61 K/UL (ref 4.5–11)

## 2024-09-27 PROCEDURE — 90653 IIV ADJUVANT VACCINE IM: CPT | Mod: ,,, | Performed by: FAMILY MEDICINE

## 2024-09-27 PROCEDURE — 99214 OFFICE O/P EST MOD 30 MIN: CPT | Mod: ,,, | Performed by: FAMILY MEDICINE

## 2024-09-27 PROCEDURE — 83036 HEMOGLOBIN GLYCOSYLATED A1C: CPT | Mod: ,,, | Performed by: CLINICAL MEDICAL LABORATORY

## 2024-09-27 PROCEDURE — 85025 COMPLETE CBC W/AUTO DIFF WBC: CPT | Mod: ,,, | Performed by: CLINICAL MEDICAL LABORATORY

## 2024-09-27 PROCEDURE — 82306 VITAMIN D 25 HYDROXY: CPT | Mod: ,,, | Performed by: CLINICAL MEDICAL LABORATORY

## 2024-09-27 PROCEDURE — 80061 LIPID PANEL: CPT | Mod: ,,, | Performed by: CLINICAL MEDICAL LABORATORY

## 2024-09-27 PROCEDURE — 86803 HEPATITIS C AB TEST: CPT | Mod: ,,, | Performed by: CLINICAL MEDICAL LABORATORY

## 2024-09-27 PROCEDURE — 84443 ASSAY THYROID STIM HORMONE: CPT | Mod: ,,, | Performed by: CLINICAL MEDICAL LABORATORY

## 2024-09-27 PROCEDURE — 80053 COMPREHEN METABOLIC PANEL: CPT | Mod: ,,, | Performed by: CLINICAL MEDICAL LABORATORY

## 2024-09-27 PROCEDURE — G0008 ADMIN INFLUENZA VIRUS VAC: HCPCS | Mod: ,,, | Performed by: FAMILY MEDICINE

## 2024-09-30 DIAGNOSIS — E89.0 POSTABLATIVE HYPOTHYROIDISM: Primary | ICD-10-CM

## 2024-09-30 RX ORDER — LEVOTHYROXINE SODIUM 150 UG/1
150 TABLET ORAL
Qty: 90 TABLET | Refills: 3 | Status: SHIPPED | OUTPATIENT
Start: 2024-09-30 | End: 2025-09-30

## 2024-10-09 ENCOUNTER — HOSPITAL ENCOUNTER (OUTPATIENT)
Dept: RADIOLOGY | Facility: HOSPITAL | Age: 79
Discharge: HOME | End: 2024-10-09
Attending: ORTHOPAEDIC SURGERY
Payer: MEDICARE

## 2024-10-09 ENCOUNTER — OFFICE VISIT (OUTPATIENT)
Dept: ORTHOPEDICS | Facility: CLINIC | Age: 79
End: 2024-10-09
Payer: MEDICARE

## 2024-10-09 VITALS
WEIGHT: 242 LBS | HEIGHT: 66 IN | BODY MASS INDEX: 38.89 KG/M2 | SYSTOLIC BLOOD PRESSURE: 177 MMHG | TEMPERATURE: 98 F | OXYGEN SATURATION: 93 % | DIASTOLIC BLOOD PRESSURE: 75 MMHG | HEART RATE: 70 BPM

## 2024-10-09 DIAGNOSIS — M79.671 ACUTE FOOT PAIN, RIGHT: Primary | ICD-10-CM

## 2024-10-09 DIAGNOSIS — M79.671 ACUTE FOOT PAIN, RIGHT: ICD-10-CM

## 2024-10-09 PROCEDURE — 99024 POSTOP FOLLOW-UP VISIT: CPT | Mod: ,,, | Performed by: ORTHOPAEDIC SURGERY

## 2024-10-09 PROCEDURE — 99214 OFFICE O/P EST MOD 30 MIN: CPT | Mod: PBBFAC,25 | Performed by: ORTHOPAEDIC SURGERY

## 2024-10-09 PROCEDURE — 99999 PR PBB SHADOW E&M-EST. PATIENT-LVL IV: CPT | Mod: PBBFAC,,, | Performed by: ORTHOPAEDIC SURGERY

## 2024-10-09 PROCEDURE — 73630 X-RAY EXAM OF FOOT: CPT | Mod: TC,RT

## 2024-10-09 NOTE — PROGRESS NOTES
Radiology Interpretation        Patient Name: Stephanie Radford  Date: 10/9/2024  YOB: 1945  MRN# [id number]        ORDERING DIAGNOSIS:    Encounter Diagnosis   Name Primary?    Acute foot pain, right Yes        Three views AP lateral oblique right foot skeletally mature individual there is a fracture of the 5th metatarsal base.  Minimal displacement no angulation impression fracture right 5th metatarsal base no shift from previous views               Rudy Fuentes MD

## 2024-10-09 NOTE — PROGRESS NOTES
Patient is here for follow-up of the 5th metatarsal base fracture.  Her x-rays show that she has healing callus present.  There has been no shift alignment.  Neurovascularly intact distally.  Let her weightbear as tolerates.  She was doing well.  I will follow back up as needed.  She is over 6 weeks out from the injury.

## 2024-10-15 ENCOUNTER — PATIENT MESSAGE (OUTPATIENT)
Dept: RESEARCH | Facility: HOSPITAL | Age: 79
End: 2024-10-15

## 2024-11-05 PROBLEM — E89.0 POSTABLATIVE HYPOTHYROIDISM: Chronic | Status: ACTIVE | Noted: 2024-11-05

## 2024-11-05 PROBLEM — R73.03 PRE-DIABETES: Chronic | Status: ACTIVE | Noted: 2024-11-05

## 2024-11-06 NOTE — PROGRESS NOTES
Subjective     Patient ID: Stephanie Radford is a 79 y.o. female.    Chief Complaint: Annual Exam    78 yo WF here for check up and lab.       Review of Systems   Constitutional:  Negative for activity change.   Eyes:  Negative for visual disturbance.   Respiratory:  Negative for shortness of breath.    Cardiovascular:  Negative for chest pain.   Gastrointestinal:  Negative for abdominal pain.   Neurological:  Negative for headaches.   Psychiatric/Behavioral:  Negative for dysphoric mood.      Office Visit on 09/27/2024   Component Date Value Ref Range Status    Hepatitis C Ab 09/27/2024 Non-Reactive  Non-Reactive Final    TSH 09/27/2024 0.196 (L)  0.358 - 3.740 uIU/mL Final    Hemoglobin A1C 09/27/2024 5.9  4.5 - 6.6 % Final      Normal:               <5.7%  Pre-Diabetic:       5.7% to 6.4%  Diabetic:             >6.4%  Diabetic Goal:     <7%    Estimated Average Glucose 09/27/2024 123  mg/dL Final    Triglycerides 09/27/2024 211 (H)  35 - 150 mg/dL Final      Normal:  <150 mg/dL  Borderline High: 150-199 mg/dL  High:   200-499 mg/dL  Very High:  >=500    Cholesterol 09/27/2024 170  0 - 200 mg/dL Final      <200 mg/dL:  Desirable  200-240 mg/dL: Borderline High  >240 mg/dL:  High    HDL Cholesterol 09/27/2024 37 (L)  40 - 60 mg/dL Final      <40 mg/dL: Low HDL  40-60 mg/dL: Normal  >60 mg/dL: Desirable    Cholesterol/HDL Ratio (Risk Factor) 09/27/2024 4.6   Final    Non-HDL 09/27/2024 133  mg/dL Final    LDL Calculated 09/27/2024 91  mg/dL Final    Unable to calculate due to one of the following values:  Cholesterol <5  HDL Cholesterol <5  Triglycerides <10 or >400    LDL/HDL 09/27/2024 2.5   Final    Unable to calculate due to one of the following values:  Cholesterol <5  HDL Cholesterol <5  Triglycerides <10 or >400    VLDL 09/27/2024 42  mg/dL Final    Sodium 09/27/2024 140  136 - 145 mmol/L Final    Potassium 09/27/2024 3.7  3.5 - 5.1 mmol/L Final    Chloride 09/27/2024 107  98 - 107 mmol/L Final    CO2  09/27/2024 26  21 - 32 mmol/L Final    Anion Gap 09/27/2024 11  7 - 16 mmol/L Final    Glucose 09/27/2024 151 (H)  74 - 106 mg/dL Final    BUN 09/27/2024 12  7 - 18 mg/dL Final    Creatinine 09/27/2024 0.87  0.55 - 1.02 mg/dL Final    BUN/Creatinine Ratio 09/27/2024 14  6 - 20 Final    Calcium 09/27/2024 8.9  8.5 - 10.1 mg/dL Final    Total Protein 09/27/2024 7.2  6.4 - 8.2 g/dL Final    Albumin 09/27/2024 3.7  3.5 - 5.0 g/dL Final    Globulin 09/27/2024 3.5  2.0 - 4.0 g/dL Final    A/G Ratio 09/27/2024 1.1   Final    Bilirubin, Total 09/27/2024 0.5  >0.0 - 1.2 mg/dL Final    Alk Phos 09/27/2024 96  55 - 142 U/L Final    ALT 09/27/2024 46  13 - 56 U/L Final    AST 09/27/2024 22  15 - 37 U/L Final    eGFR 09/27/2024 69  >=60 mL/min/1.73m2 Final    Vitamin D 25-Hydroxy, Blood 09/27/2024 38.6  ng/mL Final    Vitamin D 25-OH Adult Reference Values:  Deficiency: <20 ng/mL  Insufficiency: 20 - <30 ng/mL  Sufficiency: 30 -100 ng/mL    Vitamin D 25-OH Pediatric Reference Values:  Deficiency: <15 ng/mL  Insufficiency: 15 - <20 ng/mL  Sufficiency: 20 - 100 ng/mL    WBC 09/27/2024 6.61  4.50 - 11.00 K/uL Final    RBC 09/27/2024 4.82  4.20 - 5.40 M/uL Final    Hemoglobin 09/27/2024 14.1  12.0 - 16.0 g/dL Final    Hematocrit 09/27/2024 42.8  38.0 - 47.0 % Final    MCV 09/27/2024 88.8  80.0 - 96.0 fL Final    MCH 09/27/2024 29.3  27.0 - 31.0 pg Final    MCHC 09/27/2024 32.9  32.0 - 36.0 g/dL Final    RDW 09/27/2024 13.2  11.5 - 14.5 % Final    Platelet Count 09/27/2024 259  150 - 400 K/uL Final    MPV 09/27/2024 10.2  9.4 - 12.4 fL Final    Neutrophils % 09/27/2024 66.3 (H)  53.0 - 65.0 % Final    Lymphocytes % 09/27/2024 24.8 (L)  27.0 - 41.0 % Final    Monocytes % 09/27/2024 6.1 (H)  2.0 - 6.0 % Final    Eosinophils % 09/27/2024 1.8  1.0 - 4.0 % Final    Basophils % 09/27/2024 0.8  0.0 - 1.0 % Final    Immature Granulocytes % 09/27/2024 0.2  0.0 - 0.4 % Final    nRBC, Auto 09/27/2024 0.0  <=0.0 % Final    Neutrophils, Abs  "09/27/2024 4.39  1.80 - 7.70 K/uL Final    Lymphocytes, Absolute 09/27/2024 1.64  1.00 - 4.80 K/uL Final    Monocytes, Absolute 09/27/2024 0.40  0.00 - 0.80 K/uL Final    Eosinophils, Absolute 09/27/2024 0.12  0.00 - 0.50 K/uL Final    Basophils, Absolute 09/27/2024 0.05  0.00 - 0.20 K/uL Final    Immature Granulocytes, Absolute 09/27/2024 0.01  0.00 - 0.04 K/uL Final    nRBC, Absolute 09/27/2024 0.00  <=0.00 x10e3/uL Final    Diff Type 09/27/2024 Auto   Final          Objective   Blood pressure (!) 173/68, pulse 81, temperature 97.8 °F (36.6 °C), temperature source Oral, resp. rate 16, height 5' 6" (1.676 m), weight 109.7 kg (241 lb 12.8 oz), SpO2 95%.    Physical Exam  Constitutional:       Appearance: Normal appearance.   HENT:      Head: Normocephalic and atraumatic.      Nose: Nose normal.      Mouth/Throat:      Mouth: Mucous membranes are moist.   Eyes:      Pupils: Pupils are equal, round, and reactive to light.   Cardiovascular:      Rate and Rhythm: Normal rate and regular rhythm.      Pulses: Normal pulses.      Heart sounds: Normal heart sounds.   Pulmonary:      Effort: Pulmonary effort is normal.      Breath sounds: Normal breath sounds.   Abdominal:      General: Abdomen is flat.      Palpations: Abdomen is soft.   Skin:     General: Skin is warm and dry.   Neurological:      General: No focal deficit present.      Mental Status: She is alert and oriented to person, place, and time.   Psychiatric:         Mood and Affect: Mood normal.         Behavior: Behavior normal.         Thought Content: Thought content normal.         Judgment: Judgment normal.            Assessment and Plan     1. Mixed hyperlipidemia  -     Hemoglobin A1C; Future; Expected date: 09/27/2024  -     Lipid Panel; Future; Expected date: 09/27/2024  -     CBC Auto Differential; Future; Expected date: 09/27/2024  -     Comprehensive Metabolic Panel; Future; Expected date: 09/27/2024    2. Need for hepatitis C screening test  -     " Hepatitis C Antibody    3. Postablative hypothyroidism  -     TSH  -     Hemoglobin A1C; Future; Expected date: 09/27/2024  -     Lipid Panel; Future; Expected date: 09/27/2024  -     CBC Auto Differential; Future; Expected date: 09/27/2024  -     Comprehensive Metabolic Panel; Future; Expected date: 09/27/2024    4. Vitamin D deficiency  -     Vitamin D; Future; Expected date: 09/27/2024    5. Pre-diabetes  -     Hemoglobin A1C; Future; Expected date: 09/27/2024  -     Lipid Panel; Future; Expected date: 09/27/2024  -     CBC Auto Differential; Future; Expected date: 09/27/2024  -     Comprehensive Metabolic Panel; Future; Expected date: 09/27/2024    6. Need for immunization against influenza  -     influenza (adjuvanted) (Fluad) 45 mcg/0.5 mL IM vaccine (> or = 66 yo) 0.5 mL                   Follow up in about 1 year (around 9/27/2025) for for check up and lab fasting aptric Hughes please. .

## 2024-11-07 ENCOUNTER — HOSPITAL ENCOUNTER (OUTPATIENT)
Dept: RADIOLOGY | Facility: HOSPITAL | Age: 79
Discharge: HOME | End: 2024-11-07
Attending: FAMILY MEDICINE
Payer: MEDICARE

## 2024-11-07 VITALS — WEIGHT: 235 LBS | BODY MASS INDEX: 37.77 KG/M2 | HEIGHT: 66 IN

## 2024-11-07 DIAGNOSIS — Z12.31 OTHER SCREENING MAMMOGRAM: ICD-10-CM

## 2024-11-07 PROCEDURE — 77067 SCR MAMMO BI INCL CAD: CPT | Mod: 26,,, | Performed by: RADIOLOGY

## 2024-11-07 PROCEDURE — 77067 SCR MAMMO BI INCL CAD: CPT | Mod: TC

## 2024-11-07 PROCEDURE — 77063 BREAST TOMOSYNTHESIS BI: CPT | Mod: 26,,, | Performed by: RADIOLOGY

## 2025-02-24 ENCOUNTER — OFFICE VISIT (OUTPATIENT)
Dept: DERMATOLOGY | Facility: CLINIC | Age: 80
End: 2025-02-24
Payer: MEDICARE

## 2025-02-24 DIAGNOSIS — L40.9 PSORIASIS: Primary | ICD-10-CM

## 2025-02-24 DIAGNOSIS — D22.9 MULTIPLE BENIGN NEVI: ICD-10-CM

## 2025-02-24 RX ORDER — BETAMETHASONE DIPROPIONATE 0.5 MG/G
CREAM TOPICAL
Qty: 45 G | Refills: 3 | Status: SHIPPED | OUTPATIENT
Start: 2025-02-24

## 2025-02-24 NOTE — PROGRESS NOTES
[facility]   Ning Mccurdy MD    Patient Name: Stephanie Radford  Patient YOB: 1945   Date of Service: 2/24/25    CC: Follow-up Psoriasis    HPI: Stephanie Radford is a 79 y.o. female here today for follow-up of psoriasis, last seen 2/2024.  Previous treatments include betamethasone dipropionate.  Overall, the psoriasis is improved.  Treatment plan was followed as directed.    Past Medical History:   Diagnosis Date    Hypothyroidism      Past Surgical History:   Procedure Laterality Date    TUBAL LIGATION       Review of patient's allergies indicates:   Allergen Reactions    Penicillins Hives    Doxycycline      GI issues    Tylenol [acetaminophen]     Clindamycin Rash    Rocephin [ceftriaxone] Rash       Current Outpatient Medications:     ascorbic acid, vitamin C, (VITAMIN C) 100 MG tablet, Take 100 mg by mouth once daily., Disp: , Rfl:     aspirin (ECOTRIN) 81 MG EC tablet, Take 81 mg by mouth once daily., Disp: , Rfl:     betamethasone dipropionate 0.05 % cream, Apply to AA on body BID PRN flares tapering with improvement, Disp: 45 g, Rfl: 3    cholecalciferol, vitamin D3, 125 mcg (5,000 unit) Tab, Take 5,000 Units by mouth once daily., Disp: , Rfl:     clobetasoL (TEMOVATE) 0.05 % cream, Apply to AA on body BID PRN flares tapering with improvement, Disp: 60 g, Rfl: 3    coenzyme Q10 100 mg capsule, Take 200 mg by mouth once daily. (Patient not taking: Reported on 10/9/2024), Disp: , Rfl:     ezetimibe (ZETIA) 10 mg tablet, TAKE 1 TABLET BY MOUTH EVERY DAY, Disp: 90 tablet, Rfl: 3    levothyroxine (SYNTHROID) 150 MCG tablet, Take 1 tablet (150 mcg total) by mouth before breakfast., Disp: 90 tablet, Rfl: 3    multivit-min/iron/folic/lutein (CENTRUM SILVER WOMEN ORAL), Take 1 tablet by mouth Daily., Disp: , Rfl:     pantoprazole (PROTONIX) 40 MG tablet, Take 1 tablet (40 mg total) by mouth once daily., Disp: 30 tablet, Rfl: 11    ROS: A focused review of systems was obtained and negative.      Exam: A focused skin exam was performed. All areas examined were normal except as mentioned in the assessment and plan below.  General Appearance of the patient is well developed and well nourished.  Orientation: alert and oriented x 3.  Mood and affect: pleasant.    Assessment:   The primary encounter diagnosis was Psoriasis. A diagnosis of Multiple benign nevi was also pertinent to this visit.    Plan:   Medications Ordered This Encounter   Medications    betamethasone dipropionate 0.05 % cream     Sig: Apply to AA on body BID PRN flares tapering with improvement     Dispense:  45 g     Refill:  3       Plaque Psoriasis  - psoriasiform plaques with micaceous scale on the hands and right ankle  Status: Improved but not at treatment goal    Plan: Counseling  I counseled the patient regarding the following:  Skin care: Emollients, ambient sun exposure, shampoos with tar, selenium or zinc pyrithione can improve psoriasis.  Expectations: Psoriasis is chronic in nature with periods of remissions and flares. Flares can be triggered by stress, infections (group A strep), certain medications and alcohol.  Contact office if: Psoriasis worsens, or fails to improve despite several months of treatment.    - will restart betamethasone  - discussed systemic options if needed in the future    Benign Nevus (D22.72)  - Dome shaped regular papules on the left forehead and right nasolabial fold    Plan: Reassurance.    Plan: Counseling.  I counseled the patient regarding the following:  Instructions: Monthly self-skin checks to monitor for any changes in moles are recommended.  Expectations: Benign Nevi are pigmented nests of cells within the skin. No treatment is necessary.  Contact Office if: Any moles change in size, shape or color; itch, burn or bleed.      Follow up in about 1 year (around 2/24/2026) for Psoriasis.    Ning Mccurdy MD

## 2025-02-28 DIAGNOSIS — E89.0 POSTABLATIVE HYPOTHYROIDISM: ICD-10-CM

## 2025-02-28 RX ORDER — EZETIMIBE 10 MG/1
10 TABLET ORAL DAILY
Qty: 90 TABLET | Refills: 3 | Status: SHIPPED | OUTPATIENT
Start: 2025-02-28

## 2025-02-28 RX ORDER — LEVOTHYROXINE SODIUM 150 UG/1
150 TABLET ORAL
Qty: 90 TABLET | Refills: 3 | Status: SHIPPED | OUTPATIENT
Start: 2025-02-28 | End: 2026-02-28

## 2025-05-22 ENCOUNTER — OFFICE VISIT (OUTPATIENT)
Dept: DERMATOLOGY | Facility: CLINIC | Age: 80
End: 2025-05-22
Payer: MEDICARE

## 2025-05-22 VITALS — WEIGHT: 235 LBS | BODY MASS INDEX: 37.77 KG/M2 | HEIGHT: 66 IN

## 2025-05-22 DIAGNOSIS — L40.9 PSORIASIS: Primary | ICD-10-CM

## 2025-05-22 NOTE — PROGRESS NOTES
[facility]   Ning Mccurdy MD    Patient Name: Stephanie Radford  Patient YOB: 1945   Date of Service: 5/22/25    CC: Lesion    HPI: Stephanie Radford is a 79 y.o. female here today for lesion, located on the right lower posterior leg.  Lesion has been present for 2 weeks.  Previous treatments include no treatment.     Past Medical History:   Diagnosis Date    Hypothyroidism      Past Surgical History:   Procedure Laterality Date    TUBAL LIGATION       Review of patient's allergies indicates:   Allergen Reactions    Penicillins Hives    Doxycycline      GI issues    Tylenol [acetaminophen]     Clindamycin Rash    Rocephin [ceftriaxone] Rash     Current Medications[1]    ROS: A focused review of systems was obtained and negative.     Exam: A focused skin exam was performed. All areas examined were normal except as mentioned in the assessment and plan below.  General Appearance of the patient is well developed and well nourished.  Orientation: alert and oriented x 3.  Mood and affect: pleasant.    Assessment:   The encounter diagnosis was Psoriasis.    Plan:      Plaque Psoriasis  - psoriasiform plaques with micaceous scale located on the right lower posterior leg  Status: Inadequately controlled    Plan: Counseling  I counseled the patient regarding the following:  Skin care: Emollients, ambient sun exposure, shampoos with tar, selenium or zinc pyrithione can improve psoriasis.  Expectations: Psoriasis is chronic in nature with periods of remissions and flares. Flares can be triggered by stress, infections (group A strep), certain medications and alcohol.  Contact office if: Psoriasis worsens, or fails to improve despite several months of treatment.    - Instructed pt to use Betamethasone to AA on right posterior leg  - Will consider biopsy if not improved with topicals    Follow up if symptoms worsen or fail to improve.    Ning Mccurdy MD           [1]   Current Outpatient Medications:      ascorbic acid, vitamin C, (VITAMIN C) 100 MG tablet, Take 100 mg by mouth once daily., Disp: , Rfl:     aspirin (ECOTRIN) 81 MG EC tablet, Take 81 mg by mouth once daily., Disp: , Rfl:     betamethasone dipropionate 0.05 % cream, Apply to AA on body BID PRN flares tapering with improvement, Disp: 45 g, Rfl: 3    cholecalciferol, vitamin D3, 125 mcg (5,000 unit) Tab, Take 5,000 Units by mouth once daily., Disp: , Rfl:     clobetasoL (TEMOVATE) 0.05 % cream, Apply to AA on body BID PRN flares tapering with improvement, Disp: 60 g, Rfl: 3    coenzyme Q10 100 mg capsule, Take 200 mg by mouth once daily. (Patient not taking: Reported on 10/9/2024), Disp: , Rfl:     ezetimibe (ZETIA) 10 mg tablet, Take 1 tablet (10 mg total) by mouth once daily., Disp: 90 tablet, Rfl: 3    levothyroxine (SYNTHROID) 150 MCG tablet, Take 1 tablet (150 mcg total) by mouth before breakfast., Disp: 90 tablet, Rfl: 3    multivit-min/iron/folic/lutein (CENTRUM SILVER WOMEN ORAL), Take 1 tablet by mouth Daily., Disp: , Rfl:     pantoprazole (PROTONIX) 40 MG tablet, Take 1 tablet (40 mg total) by mouth once daily., Disp: 30 tablet, Rfl: 11

## 2025-07-06 ENCOUNTER — HOSPITAL ENCOUNTER (EMERGENCY)
Facility: HOSPITAL | Age: 80
Discharge: HOME | End: 2025-07-06
Payer: MEDICARE

## 2025-07-06 VITALS
HEIGHT: 66 IN | DIASTOLIC BLOOD PRESSURE: 83 MMHG | BODY MASS INDEX: 38.57 KG/M2 | TEMPERATURE: 97 F | HEART RATE: 70 BPM | SYSTOLIC BLOOD PRESSURE: 163 MMHG | OXYGEN SATURATION: 95 % | RESPIRATION RATE: 14 BRPM | WEIGHT: 240 LBS

## 2025-07-06 DIAGNOSIS — R07.81 RIB PAIN ON RIGHT SIDE: ICD-10-CM

## 2025-07-06 DIAGNOSIS — I10 ESSENTIAL HYPERTENSION, BENIGN: ICD-10-CM

## 2025-07-06 DIAGNOSIS — M25.511 RIGHT SHOULDER PAIN: Primary | ICD-10-CM

## 2025-07-06 DIAGNOSIS — M79.603 ARM PAIN: ICD-10-CM

## 2025-07-06 PROCEDURE — 96372 THER/PROPH/DIAG INJ SC/IM: CPT | Performed by: NURSE PRACTITIONER

## 2025-07-06 PROCEDURE — 93010 ELECTROCARDIOGRAM REPORT: CPT | Mod: ,,, | Performed by: INTERNAL MEDICINE

## 2025-07-06 PROCEDURE — 93005 ELECTROCARDIOGRAM TRACING: CPT

## 2025-07-06 PROCEDURE — 99284 EMERGENCY DEPT VISIT MOD MDM: CPT | Mod: 25

## 2025-07-06 PROCEDURE — 63600175 PHARM REV CODE 636 W HCPCS: Mod: JZ,TB | Performed by: NURSE PRACTITIONER

## 2025-07-06 RX ORDER — KETOROLAC TROMETHAMINE 30 MG/ML
30 INJECTION, SOLUTION INTRAMUSCULAR; INTRAVENOUS
Status: COMPLETED | OUTPATIENT
Start: 2025-07-06 | End: 2025-07-06

## 2025-07-06 RX ORDER — CLONIDINE HYDROCHLORIDE 0.1 MG/1
0.1 TABLET ORAL
Status: DISCONTINUED | OUTPATIENT
Start: 2025-07-06 | End: 2025-07-06

## 2025-07-06 RX ADMIN — KETOROLAC TROMETHAMINE 30 MG: 30 INJECTION, SOLUTION INTRAMUSCULAR at 09:07

## 2025-07-06 NOTE — ED PROVIDER NOTES
Encounter Date: 7/6/2025       History     Chief Complaint   Patient presents with    Arm Pain     Pt presents to ER with complaints of right arm, shoulder blade, and back pain for the last week. Pt believes she may have pneumonia- pt denies sob, cough, or fever.      Pt presents with right shoulder and right rib pain x 1 week. Pt states she has had pneumonia in the past but no other symptoms at this time.         Review of patient's allergies indicates:   Allergen Reactions    Penicillins Hives    Doxycycline      GI issues    Tylenol [acetaminophen]     Clindamycin Rash    Rocephin [ceftriaxone] Rash     Past Medical History:   Diagnosis Date    Hypothyroidism      Past Surgical History:   Procedure Laterality Date    TUBAL LIGATION       No family history on file.  Social History[1]  Review of Systems   Constitutional:  Negative for fever.   HENT:  Negative for sore throat.    Respiratory:  Negative for shortness of breath.    Cardiovascular:  Negative for chest pain.   Gastrointestinal:  Negative for nausea.   Genitourinary:  Negative for dysuria.   Musculoskeletal:  Negative for back pain.        Right shoulder pain and right rib pain   Skin:  Negative for rash.   Neurological:  Negative for weakness.   Hematological:  Does not bruise/bleed easily.   Psychiatric/Behavioral:  Negative for behavioral problems.        Physical Exam     Initial Vitals [07/06/25 0744]   BP Pulse Resp Temp SpO2   (!) 138/105 77 20 97.3 °F (36.3 °C) 95 %      MAP       --         Physical Exam    Nursing note and vitals reviewed.  Constitutional: She appears well-developed.   HENT:   Head: Normocephalic.   Neck:   Normal range of motion.  Cardiovascular:  Normal rate and regular rhythm.           Pulmonary/Chest: Breath sounds normal.   Musculoskeletal:         General: Normal range of motion.      Cervical back: Normal range of motion.     Neurological: She is alert and oriented to person, place, and time.   Psychiatric: She has a  "normal mood and affect. Thought content normal.         Medical Screening Exam   See Full Note    ED Course   Procedures  Labs Reviewed - No data to display       Imaging Results              X-Ray Shoulder Complete 2 View Right (Final result)  Result time 07/06/25 10:16:09      Final result by Harry Saunders MD (07/06/25 10:16:09)                   Impression:      No acute displaced fracture.      Electronically signed by: Harry Saunders MD  Date:    07/06/2025  Time:    10:16               Narrative:    EXAMINATION:  XR SHOULDER COMPLETE 2 OR MORE VIEWS RIGHT    CLINICAL HISTORY:  Pain in right shoulder    TECHNIQUE:  Two or three views of the right shoulder were performed.    COMPARISON:  None    FINDINGS:  No acute displaced fracture or dislocation.  Mild degenerative changes.  No unexpected radiopaque foreign body.                                       X-Ray Chest AP Portable (Final result)  Result time 07/06/25 09:12:20      Final result by Harry Saunders MD (07/06/25 09:12:20)                   Impression:      No large focal consolidation identified on this limited single view.      Electronically signed by: Harry Saunders MD  Date:    07/06/2025  Time:    09:12               Narrative:    EXAMINATION:  XR CHEST AP PORTABLE    CLINICAL HISTORY:  Provided history is "  Pleurodynia".    TECHNIQUE:  One view of the chest.    COMPARISON:  07/31/2024.    FINDINGS:  Cardiac wires overlie the chest.  Cardiomediastinal silhouette is not significantly enlarged.  There are coarse interstitial lung markings but no large focal area of consolidation.  No sizable pleural effusion.  No pneumothorax.                                       Medications   ketorolac injection 30 mg (30 mg Intramuscular Given 7/6/25 0943)     Medical Decision Making  Pt presents with right shoulder and right rib pain x 1 week. Pt states she has had pneumonia in the past but no other symptoms at this time.     Amount and/or Complexity " of Data Reviewed  Radiology: ordered.     Details: Chest xray is normal   Right shoulder xray-normal    Risk  Prescription drug management.  Risk Details: Pt is discharged home in stable condition with diagnosis of right shoulder pain.                                       Clinical Impression:   Final diagnoses:  [M79.603] Arm pain  [M25.511] Right shoulder pain (Primary)  [R07.81] Rib pain on right side  [I10] Essential hypertension, benign        ED Disposition Condition    Discharge Stable          ED Prescriptions    None       Follow-up Information    None              [1]   Social History  Tobacco Use    Smoking status: Never     Passive exposure: Past    Smokeless tobacco: Never   Substance Use Topics    Alcohol use: Yes     Alcohol/week: 2.0 standard drinks of alcohol     Types: 2 Cans of beer per week    Drug use: Never        Salima Thomas, FNP  07/06/25 1028

## 2025-07-08 LAB
OHS QRS DURATION: 78 MS
OHS QTC CALCULATION: 420 MS

## 2025-07-09 ENCOUNTER — TELEPHONE (OUTPATIENT)
Dept: FAMILY MEDICINE | Facility: CLINIC | Age: 80
End: 2025-07-09
Payer: MEDICARE

## 2025-07-09 NOTE — TELEPHONE ENCOUNTER
Copied from CRM #[id number]. Topic: Appointments - Appointment Access  >> Jul 9, 2025  9:39 AM Kallie wrote:  Who Called: Stephanie Prabhakar    Caller is requesting a sooner appointment.     When is the first available appointment?AUGUST 1  Options offered (Virtual Visit, Urgent Care):   Symptoms:RIGHT SHOULDER PAIN- ER FOLLOW UP      Preferred Method of Contact: Phone Call  Patient's Preferred Phone Number on File: [phone]   Best Call Back Number, if different:  Additional Information: PATIENT WAS SEEN IN [facility] ON JULY 6

## 2025-08-04 ENCOUNTER — OFFICE VISIT (OUTPATIENT)
Dept: FAMILY MEDICINE | Facility: CLINIC | Age: 80
End: 2025-08-04
Payer: MEDICARE

## 2025-08-04 VITALS
BODY MASS INDEX: 38.19 KG/M2 | HEART RATE: 69 BPM | TEMPERATURE: 99 F | DIASTOLIC BLOOD PRESSURE: 71 MMHG | OXYGEN SATURATION: 94 % | WEIGHT: 237.63 LBS | HEIGHT: 66 IN | SYSTOLIC BLOOD PRESSURE: 159 MMHG

## 2025-08-04 DIAGNOSIS — R59.0 ENLARGED LYMPH NODES IN ARMPIT: Primary | ICD-10-CM

## 2025-08-04 DIAGNOSIS — S83.91XA SPRAIN OF RIGHT KNEE, UNSPECIFIED LIGAMENT, INITIAL ENCOUNTER: ICD-10-CM

## 2025-08-04 PROBLEM — R22.31 LUMP IN ARMPIT, RIGHT: Status: ACTIVE | Noted: 2025-08-04

## 2025-08-04 PROCEDURE — 99213 OFFICE O/P EST LOW 20 MIN: CPT | Mod: ,,, | Performed by: NURSE PRACTITIONER

## 2025-08-04 RX ORDER — IBUPROFEN 800 MG/1
800 TABLET, FILM COATED ORAL EVERY 6 HOURS PRN
Qty: 30 TABLET | Refills: 1 | Status: SHIPPED | OUTPATIENT
Start: 2025-08-04

## 2025-08-04 RX ORDER — AZITHROMYCIN 250 MG/1
TABLET, FILM COATED ORAL
Qty: 6 TABLET | Refills: 0 | Status: SHIPPED | OUTPATIENT
Start: 2025-08-04 | End: 2025-08-09

## 2025-08-04 NOTE — PROGRESS NOTES
"[facility]  Chief Complaint      Chief Complaint   Patient presents with    Lump     C/O lump to right axilla that she noticed 1 week ago. Tender to touch.     Knee Pain     C/o twisting right knee in beginning of June. Rates "6-7" and describes as "ache and sharp with turning."       History of Present Illness      Stephanie Radford is a 79 y.o. female with chronic conditions of HTN, Hypothyroidism, GERD, and Hyperlipidemia. She is an established pt of [facility]. She presents today for evaluation of a lump to her right axilla. Reports she noticed the tender lump 1 week ago. She denies redness or excessive heat to armpit. The pt denies fever or chills. Reports normal mammogram this year.  The pt also reports twisting her right knee 4 weeks ago, reports aching and sharp pain to right knee when turning knee, rates pain "6-7"/10.     Knee Pain   The incident occurred more than 1 week ago. The injury mechanism was a twisting injury. The pain is present in the right knee. The quality of the pain is described as aching. The pain is at a severity of 7/10. The pain has been Intermittent since onset. Pertinent negatives include no inability to bear weight, loss of motion, loss of sensation, numbness or tingling. She has tried NSAIDs for the symptoms. The treatment provided moderate relief.        Past Medical History:  Past Medical History:   Diagnosis Date    Hypothyroidism        Past Surgical History:   has a past surgical history that includes Tubal ligation.    Social History:  Social History[1]    I personally reviewed all past medical, surgical, and social.     Review of Systems   Constitutional:  Negative for appetite change, fatigue, fever and unexpected weight change.   Respiratory:  Negative for cough, shortness of breath and wheezing.    Cardiovascular:  Negative for chest pain and leg swelling.   Gastrointestinal:  Negative for abdominal pain, constipation, diarrhea, nausea and " vomiting.   Genitourinary:  Negative for decreased urine volume, difficulty urinating, dysuria and flank pain.   Musculoskeletal:  Positive for arthralgias and myalgias.   Skin:  Negative for color change and rash.        Enlarged lymph node to right armpit     Neurological:  Negative for dizziness, tingling, syncope, weakness, numbness and headaches.   Psychiatric/Behavioral:  Negative for dysphoric mood.         Medications:  Encounter Medications[2]    Allergies:  Review of patient's allergies indicates:   Allergen Reactions    Penicillins Hives    Doxycycline      GI issues    Tylenol [acetaminophen]     Clindamycin Rash    Rocephin [ceftriaxone] Rash       Health Maintenance:  Immunization History   Administered Date(s) Administered    COVID-19, MRNA, LN-S, PF (MODERNA FULL 0.5 ML DOSE) 03/23/2021, 02/24/2023    Influenza (FLUAD) - Quadrivalent - Adjuvanted - PF *Preferred* (65+) 09/28/2022, 09/27/2023    Influenza - Quadrivalent - PF *Preferred* (6 months and older) 02/02/2017, 09/15/2017    Influenza - Trivalent - Fluad - Adjuvanted - PF (65 years and older 09/27/2024    Influenza - Trivalent - Fluzone High Dose - PF (65 years and older) 10/25/2018, 02/25/2020    Pneumococcal Conjugate - 13 Valent 05/14/2012    Pneumococcal Polysaccharide - 23 Valent 03/26/2019    Zoster Recombinant 05/13/2019, 09/16/2019        Health Maintenance   Topic Date Due    TETANUS VACCINE  Never done    RSV Vaccine (Age 60+ and Pregnant patients) (1 - 1-dose 75+ series) Never done    COVID-19 Vaccine (3 - 2024-25 season) 09/01/2024    Influenza Vaccine (1) 09/01/2025    Hemoglobin A1c (Prediabetes)  09/27/2025    DEXA Scan  10/27/2027    Lipid Panel  09/27/2029    Hepatitis C Screening  Completed    Shingles Vaccine  Completed    Pneumococcal Vaccines (Age 50+)  Completed        Physical Exam      Vital Signs  Temp: 98.6 °F (37 °C)  Temp Source: Oral  Pulse: 69  SpO2: (!) 94 %  BP: (!) 159/71  BP Location:  "Right arm  Patient Position: Sitting  Pain Score:   6  Pain Loc: Knee (right)  Height and Weight  Height: 5' 6" (167.6 cm)  Weight: 107.8 kg (237 lb 9.6 oz)  BSA (Calculated - sq m): 2.24 sq meters  BMI (Calculated): 38.4  Weight in (lb) to have BMI = 25: 154.6]    Physical Exam  Constitutional:       General: She is not in acute distress.     Appearance: Normal appearance.   HENT:      Head: Normocephalic.      Mouth/Throat:      Mouth: Mucous membranes are moist.   Cardiovascular:      Rate and Rhythm: Normal rate and regular rhythm.      Pulses: Normal pulses.      Heart sounds: Normal heart sounds. No murmur heard.  Pulmonary:      Effort: Pulmonary effort is normal. No respiratory distress.      Breath sounds: Normal breath sounds. No wheezing, rhonchi or rales.   Abdominal:      Palpations: Abdomen is soft.      Tenderness: There is no abdominal tenderness.   Musculoskeletal:         General: Normal range of motion.      Cervical back: Neck supple.      Right knee: No swelling, deformity, effusion or erythema. Normal range of motion. Tenderness present. Normal pulse.   Skin:     General: Skin is warm and dry.      Comments: Enlarged lymph node to right armpit. Area tender without redness or heat.    Neurological:      Mental Status: She is alert and oriented to person, place, and time.   Psychiatric:         Mood and Affect: Mood normal.         Behavior: Behavior normal.        Laboratory:  CBC:  Recent Labs   Lab 09/28/22  1405 09/15/23  0834 09/27/24  1402   WBC 6.54 5.75 6.61   RBC 4.99 4.95 4.82   Hemoglobin 14.9 14.5 14.1   Hematocrit 43.7 43.8 42.8   Platelet Count 307 277 259   MCV 87.6 88.5 88.8   MCH 29.9 29.3 29.3   MCHC 34.1 33.1 32.9       LIPIDS:  Recent Labs   Lab 09/28/22  1405 03/02/23  0956 09/15/23  0834 09/27/24  1008 09/27/24  1402   TSH 2.840  --  2.000 0.196 L  --    HDL Cholesterol 35 L 38 L 39 L  --  37 L   Cholesterol 232 H 168 190  --  170   Triglycerides 182 H 151 H 139  --  211 H " "  LDL Calculated 161 100 123  --  91   Cholesterol/HDL Ratio (Risk Factor) 6.6 4.4 4.9  --  4.6   Non- 130 151  --  133       TSH:  Recent Labs   Lab 09/28/22  1405 09/15/23  0834 09/27/24  1008   TSH 2.840 2.000 0.196 L       A1C:  Recent Labs   Lab 09/28/22  1405 03/02/23  0956 09/15/23  0834 09/27/24  1402   Hemoglobin A1C 5.8 5.7 5.7 5.9       Lab Results   Component Value Date     (H) 09/27/2024     09/27/2024    K 3.7 09/27/2024     09/27/2024    CO2 26 09/27/2024    BUN 12 09/27/2024    CREATININE 0.87 09/27/2024    CALCIUM 8.9 09/27/2024    PROT 7.2 09/27/2024    ALBUMIN 3.7 09/27/2024    BILITOT 0.5 09/27/2024    ALKPHOS 96 09/27/2024    AST 22 09/27/2024    ALT 46 09/27/2024    ANIONGAP 11 09/27/2024       Lab Results   Component Value Date    WBC 6.61 09/27/2024    RBC 4.82 09/27/2024    HGB 14.1 09/27/2024    HCT 42.8 09/27/2024    MCV 88.8 09/27/2024    RDW 13.2 09/27/2024     09/27/2024        Lab Results   Component Value Date    CHOL 170 09/27/2024    TRIG 211 (H) 09/27/2024    HDL 37 (L) 09/27/2024    LDLCALC 91 09/27/2024       Lab Results   Component Value Date    TSH 0.196 (L) 09/27/2024       Lab Results   Component Value Date    HGBA1C 5.9 09/27/2024    ESTIMATEDAVG 123 09/27/2024        No components found for: "VITAMIND"    No results found for: "PSA"    Point Of Care Testing:  Nitrites, UA   Date Value Ref Range Status   09/15/2023 Negative Negative Final     Urobilinogen, UA   Date Value Ref Range Status   09/15/2023 Normal 0.2, 1.0, Normal mg/dL Final     pH, UA   Date Value Ref Range Status   09/15/2023 6.0 5.0 to 8.0 pH Units Final     Specific Gravity, UA   Date Value Ref Range Status   09/15/2023 1.032 (H) <=1.030 Final     Ketones, UA   Date Value Ref Range Status   09/15/2023 Negative Negative mg/dL Final       No results found for: "DKEHHVN3EO", "RAPFLUA", "RAPFLUB"      Assessment/Plan     1. Enlarged lymph nodes in armpit  -     azithromycin " (Z-INOCENCIA) 250 MG tablet; Take 2 tablets by mouth on day 1; Take 1 tablet by mouth on days 2-5  Dispense: 6 tablet; Refill: 0  -     ibuprofen (ADVIL,MOTRIN) 800 MG tablet; Take 1 tablet (800 mg total) by mouth every 6 (six) hours as needed for Pain.  Dispense: 30 tablet; Refill: 1    2. Sprain of right knee, unspecified ligament, initial encounter  -     ibuprofen (ADVIL,MOTRIN) 800 MG tablet; Take 1 tablet (800 mg total) by mouth every 6 (six) hours as needed for Pain.  Dispense: 30 tablet; Refill: 1       Use warm compress to right armpit. Elevate right knee, use warm moist heat to right knee.     Return to clinic if symptoms persist, worsen or fail to improve.    Questions answered to desired level of satisfaction    Verbalized understanding to all information and instructions provided      [facility]AT[facility]                 [1]  Social History  Tobacco Use    Smoking status: Never     Passive exposure: Past    Smokeless tobacco: Never   Substance Use Topics    Alcohol use: Yes     Alcohol/week: 2.0 standard drinks of alcohol     Types: 2 Cans of beer per week    Drug use: Never   [2]  Outpatient Encounter Medications as of 8/4/2025   Medication Sig Dispense Refill    ascorbic acid, vitamin C, (VITAMIN C) 100 MG tablet Take 100 mg by mouth once daily.      aspirin (ECOTRIN) 81 MG EC tablet Take 81 mg by mouth once daily.      azithromycin (Z-INOCENCIA) 250 MG tablet Take 2 tablets by mouth on day 1; Take 1 tablet by mouth on days 2-5 6 tablet 0    betamethasone dipropionate 0.05 % cream Apply to AA on body BID PRN flares tapering with improvement 45 g 3    cholecalciferol, vitamin D3, 125 mcg (5,000 unit) Tab Take 5,000 Units by mouth once daily.      clobetasoL (TEMOVATE) 0.05 % cream Apply to AA on body BID PRN flares tapering with improvement 60 g 3    ezetimibe (ZETIA) 10 mg tablet Take 1 tablet (10 mg total) by mouth once daily. 90 tablet 3    ibuprofen  (ADVIL,MOTRIN) 800 MG tablet Take 1 tablet (800 mg total) by mouth every 6 (six) hours as needed for Pain. 30 tablet 1    levothyroxine (SYNTHROID) 150 MCG tablet Take 1 tablet (150 mcg total) by mouth before breakfast. 90 tablet 3    multivit-min/iron/folic/lutein (CENTRUM SILVER WOMEN ORAL) Take 1 tablet by mouth Daily.      pantoprazole (PROTONIX) 40 MG tablet Take 1 tablet (40 mg total) by mouth once daily. 30 tablet 11    [DISCONTINUED] coenzyme Q10 100 mg capsule Take 200 mg by mouth once daily. (Patient not taking: Reported on 10/9/2024)       No facility-administered encounter medications on file as of 8/4/2025.